# Patient Record
Sex: FEMALE | Race: WHITE | Employment: OTHER | URBAN - METROPOLITAN AREA
[De-identification: names, ages, dates, MRNs, and addresses within clinical notes are randomized per-mention and may not be internally consistent; named-entity substitution may affect disease eponyms.]

---

## 2017-01-03 ENCOUNTER — OFFICE VISIT (OUTPATIENT)
Dept: ORTHOPEDIC SURGERY | Age: 57
End: 2017-01-03

## 2017-01-03 VITALS — BODY MASS INDEX: 20.95 KG/M2 | HEIGHT: 68 IN | WEIGHT: 138.23 LBS

## 2017-01-03 DIAGNOSIS — M75.101 TEAR OF RIGHT ROTATOR CUFF, UNSPECIFIED TEAR EXTENT: ICD-10-CM

## 2017-01-03 DIAGNOSIS — M25.511 RIGHT SHOULDER PAIN, UNSPECIFIED CHRONICITY: Primary | ICD-10-CM

## 2017-01-03 PROCEDURE — 99213 OFFICE O/P EST LOW 20 MIN: CPT | Performed by: ORTHOPAEDIC SURGERY

## 2017-01-03 PROCEDURE — 73030 X-RAY EXAM OF SHOULDER: CPT | Performed by: ORTHOPAEDIC SURGERY

## 2017-01-17 ENCOUNTER — OFFICE VISIT (OUTPATIENT)
Dept: ORTHOPEDIC SURGERY | Age: 57
End: 2017-01-17

## 2017-01-17 VITALS — WEIGHT: 138.23 LBS | BODY MASS INDEX: 20.95 KG/M2 | HEIGHT: 68 IN

## 2017-01-17 DIAGNOSIS — M75.81 TENDINITIS OF RIGHT ROTATOR CUFF: ICD-10-CM

## 2017-01-17 DIAGNOSIS — M75.01 ADHESIVE CAPSULITIS OF RIGHT SHOULDER: ICD-10-CM

## 2017-01-17 PROCEDURE — 20610 DRAIN/INJ JOINT/BURSA W/O US: CPT | Performed by: ORTHOPAEDIC SURGERY

## 2017-01-17 PROCEDURE — 99213 OFFICE O/P EST LOW 20 MIN: CPT | Performed by: ORTHOPAEDIC SURGERY

## 2017-01-24 ENCOUNTER — HOSPITAL ENCOUNTER (OUTPATIENT)
Dept: PHYSICAL THERAPY | Age: 57
Discharge: HOME | End: 2017-01-31
Admitting: ORTHOPAEDIC SURGERY

## 2017-01-27 ENCOUNTER — HOSPITAL ENCOUNTER (OUTPATIENT)
Dept: PHYSICAL THERAPY | Age: 57
Discharge: HOME | End: 2017-01-27
Admitting: ORTHOPAEDIC SURGERY

## 2017-01-31 ENCOUNTER — HOSPITAL ENCOUNTER (OUTPATIENT)
Dept: PHYSICAL THERAPY | Age: 57
Discharge: HOME | End: 2017-01-31
Admitting: ORTHOPAEDIC SURGERY

## 2017-02-03 ENCOUNTER — HOSPITAL ENCOUNTER (OUTPATIENT)
Dept: PHYSICAL THERAPY | Age: 57
Discharge: HOME | End: 2017-02-03
Admitting: ORTHOPAEDIC SURGERY

## 2017-02-07 ENCOUNTER — HOSPITAL ENCOUNTER (OUTPATIENT)
Dept: PHYSICAL THERAPY | Age: 57
Discharge: HOME | End: 2017-02-07
Admitting: ORTHOPAEDIC SURGERY

## 2017-02-15 ENCOUNTER — OFFICE VISIT (OUTPATIENT)
Dept: ORTHOPEDIC SURGERY | Age: 57
End: 2017-02-15

## 2017-02-15 VITALS — BODY MASS INDEX: 21.22 KG/M2 | WEIGHT: 140 LBS | HEIGHT: 68 IN

## 2017-02-15 DIAGNOSIS — M22.42 PATELLOFEMORAL CHONDROSIS, LEFT: ICD-10-CM

## 2017-02-15 DIAGNOSIS — M25.562 LEFT KNEE PAIN, UNSPECIFIED CHRONICITY: Primary | ICD-10-CM

## 2017-02-15 PROBLEM — M22.40 PATELLOFEMORAL CHONDROSIS: Status: ACTIVE | Noted: 2017-02-15

## 2017-02-15 PROCEDURE — 73564 X-RAY EXAM KNEE 4 OR MORE: CPT | Performed by: ORTHOPAEDIC SURGERY

## 2017-02-15 PROCEDURE — 99213 OFFICE O/P EST LOW 20 MIN: CPT | Performed by: ORTHOPAEDIC SURGERY

## 2017-03-03 ENCOUNTER — OFFICE VISIT (OUTPATIENT)
Dept: ORTHOPEDIC SURGERY | Age: 57
End: 2017-03-03

## 2017-03-03 VITALS — BODY MASS INDEX: 21.22 KG/M2 | WEIGHT: 139.99 LBS | HEIGHT: 68 IN

## 2017-03-03 DIAGNOSIS — M75.01 ADHESIVE CAPSULITIS OF RIGHT SHOULDER: Primary | ICD-10-CM

## 2017-03-03 PROCEDURE — 99213 OFFICE O/P EST LOW 20 MIN: CPT | Performed by: ORTHOPAEDIC SURGERY

## 2017-03-06 ENCOUNTER — OFFICE VISIT (OUTPATIENT)
Dept: ORTHOPEDIC SURGERY | Age: 57
End: 2017-03-06

## 2017-03-06 VITALS
HEART RATE: 85 BPM | SYSTOLIC BLOOD PRESSURE: 110 MMHG | DIASTOLIC BLOOD PRESSURE: 79 MMHG | HEIGHT: 68 IN | BODY MASS INDEX: 21.22 KG/M2 | WEIGHT: 139.99 LBS

## 2017-03-06 DIAGNOSIS — M50.30 DDD (DEGENERATIVE DISC DISEASE), CERVICAL: ICD-10-CM

## 2017-03-06 DIAGNOSIS — R20.2 ARM PARESTHESIA, RIGHT: ICD-10-CM

## 2017-03-06 DIAGNOSIS — M47.812 SPONDYLOSIS OF CERVICAL REGION WITHOUT MYELOPATHY OR RADICULOPATHY: Primary | ICD-10-CM

## 2017-03-06 DIAGNOSIS — M54.2 CERVICAL PAIN (NECK): ICD-10-CM

## 2017-03-06 PROCEDURE — 72040 X-RAY EXAM NECK SPINE 2-3 VW: CPT | Performed by: PHYSICAL MEDICINE & REHABILITATION

## 2017-03-06 PROCEDURE — 99214 OFFICE O/P EST MOD 30 MIN: CPT | Performed by: PHYSICAL MEDICINE & REHABILITATION

## 2017-03-08 ENCOUNTER — OFFICE VISIT (OUTPATIENT)
Dept: ORTHOPEDIC SURGERY | Age: 57
End: 2017-03-08

## 2017-03-08 DIAGNOSIS — M79.601 PAIN OF RIGHT UPPER EXTREMITY: Primary | ICD-10-CM

## 2017-03-08 DIAGNOSIS — M79.605 PAIN OF LEFT LOWER EXTREMITY: ICD-10-CM

## 2017-03-08 PROCEDURE — 95886 MUSC TEST DONE W/N TEST COMP: CPT | Performed by: PHYSICAL MEDICINE & REHABILITATION

## 2017-03-08 PROCEDURE — 95910 NRV CNDJ TEST 7-8 STUDIES: CPT | Performed by: PHYSICAL MEDICINE & REHABILITATION

## 2017-03-13 ENCOUNTER — OFFICE VISIT (OUTPATIENT)
Dept: ORTHOPEDIC SURGERY | Age: 57
End: 2017-03-13

## 2017-03-13 VITALS — WEIGHT: 139.99 LBS | HEIGHT: 68 IN | BODY MASS INDEX: 21.22 KG/M2

## 2017-03-13 DIAGNOSIS — M54.17 LUMBOSACRAL RADICULOPATHY AT L4: Primary | ICD-10-CM

## 2017-03-13 PROCEDURE — 99213 OFFICE O/P EST LOW 20 MIN: CPT | Performed by: ORTHOPAEDIC SURGERY

## 2017-03-17 ENCOUNTER — OFFICE VISIT (OUTPATIENT)
Dept: ORTHOPEDIC SURGERY | Age: 57
End: 2017-03-17

## 2017-03-17 VITALS
SYSTOLIC BLOOD PRESSURE: 119 MMHG | BODY MASS INDEX: 21.22 KG/M2 | HEIGHT: 68 IN | DIASTOLIC BLOOD PRESSURE: 70 MMHG | WEIGHT: 140 LBS

## 2017-03-17 DIAGNOSIS — M54.12 CERVICAL RADICULOPATHY: ICD-10-CM

## 2017-03-17 DIAGNOSIS — M47.816 SPONDYLOSIS OF LUMBAR REGION WITHOUT MYELOPATHY OR RADICULOPATHY: ICD-10-CM

## 2017-03-17 DIAGNOSIS — M50.20 CERVICAL DISC HERNIATION: Primary | ICD-10-CM

## 2017-03-17 DIAGNOSIS — M54.16 LUMBAR RADICULOPATHY: ICD-10-CM

## 2017-03-17 PROCEDURE — 99214 OFFICE O/P EST MOD 30 MIN: CPT | Performed by: PHYSICAL MEDICINE & REHABILITATION

## 2017-03-29 ENCOUNTER — HOSPITAL ENCOUNTER (OUTPATIENT)
Dept: PHYSICAL THERAPY | Age: 57
Discharge: HOME | End: 2017-03-29
Admitting: ORTHOPAEDIC SURGERY

## 2017-03-31 ENCOUNTER — HOSPITAL ENCOUNTER (OUTPATIENT)
Dept: PHYSICAL THERAPY | Age: 57
Discharge: HOME | End: 2017-03-31
Admitting: ORTHOPAEDIC SURGERY

## 2017-04-05 ENCOUNTER — HOSPITAL ENCOUNTER (OUTPATIENT)
Dept: PHYSICAL THERAPY | Age: 57
Discharge: HOME | End: 2017-04-05
Admitting: ORTHOPAEDIC SURGERY

## 2017-04-07 ENCOUNTER — HOSPITAL ENCOUNTER (OUTPATIENT)
Dept: PHYSICAL THERAPY | Age: 57
Discharge: HOME | End: 2017-04-07
Admitting: ORTHOPAEDIC SURGERY

## 2017-04-12 ENCOUNTER — HOSPITAL ENCOUNTER (OUTPATIENT)
Dept: PHYSICAL THERAPY | Age: 57
Discharge: HOME | End: 2017-04-12
Admitting: ORTHOPAEDIC SURGERY

## 2017-05-30 ENCOUNTER — OFFICE VISIT (OUTPATIENT)
Dept: ORTHOPEDIC SURGERY | Age: 57
End: 2017-05-30

## 2017-05-30 DIAGNOSIS — M25.542 ARTHRALGIA OF LEFT HAND: ICD-10-CM

## 2017-05-30 DIAGNOSIS — M25.541 ARTHRALGIA OF RIGHT HAND: Primary | ICD-10-CM

## 2017-05-30 DIAGNOSIS — M19.049 PRIMARY LOCALIZED OSTEOARTHROSIS, HAND, UNSPECIFIED LATERALITY: ICD-10-CM

## 2017-05-30 PROCEDURE — 20600 DRAIN/INJ JOINT/BURSA W/O US: CPT | Performed by: ORTHOPAEDIC SURGERY

## 2017-05-30 PROCEDURE — 73140 X-RAY EXAM OF FINGER(S): CPT | Performed by: ORTHOPAEDIC SURGERY

## 2017-05-30 PROCEDURE — 99213 OFFICE O/P EST LOW 20 MIN: CPT | Performed by: ORTHOPAEDIC SURGERY

## 2017-06-14 ENCOUNTER — OFFICE VISIT (OUTPATIENT)
Dept: ENT CLINIC | Age: 57
End: 2017-06-14

## 2017-06-14 VITALS
BODY MASS INDEX: 21.22 KG/M2 | SYSTOLIC BLOOD PRESSURE: 102 MMHG | HEIGHT: 68 IN | WEIGHT: 140 LBS | HEART RATE: 81 BPM | DIASTOLIC BLOOD PRESSURE: 64 MMHG

## 2017-06-14 DIAGNOSIS — Z87.09 HISTORY OF THROAT PROBLEM: ICD-10-CM

## 2017-06-14 DIAGNOSIS — R06.09 OTHER FORM OF DYSPNEA: Primary | ICD-10-CM

## 2017-06-14 DIAGNOSIS — J01.90 ACUTE BACTERIAL RHINOSINUSITIS: ICD-10-CM

## 2017-06-14 DIAGNOSIS — B96.89 ACUTE BACTERIAL RHINOSINUSITIS: ICD-10-CM

## 2017-06-14 PROCEDURE — 99214 OFFICE O/P EST MOD 30 MIN: CPT | Performed by: OTOLARYNGOLOGY

## 2017-06-14 RX ORDER — CEFDINIR 300 MG/1
600 CAPSULE ORAL DAILY
Qty: 28 CAPSULE | Refills: 0 | Status: SHIPPED | OUTPATIENT
Start: 2017-06-14 | End: 2017-06-28

## 2017-06-21 ENCOUNTER — OFFICE VISIT (OUTPATIENT)
Dept: ORTHOPEDIC SURGERY | Age: 57
End: 2017-06-21

## 2017-06-21 VITALS — WEIGHT: 139.99 LBS | BODY MASS INDEX: 21.22 KG/M2 | HEIGHT: 68 IN

## 2017-06-21 DIAGNOSIS — M54.16 LUMBAR RADICULOPATHY: Primary | ICD-10-CM

## 2017-06-21 DIAGNOSIS — H81.09 MENIERE'S DISEASE, COCHLEOVESTIBULAR, ACTIVE, UNSPECIFIED LATERALITY: ICD-10-CM

## 2017-06-21 PROCEDURE — 99213 OFFICE O/P EST LOW 20 MIN: CPT | Performed by: ORTHOPAEDIC SURGERY

## 2017-07-04 RX ORDER — TRIAMTERENE AND HYDROCHLOROTHIAZIDE 37.5; 25 MG/1; MG/1
CAPSULE ORAL
Qty: 90 CAPSULE | Refills: 0 | Status: SHIPPED | OUTPATIENT
Start: 2017-07-04 | End: 2017-10-02 | Stop reason: SDUPTHER

## 2017-07-08 ENCOUNTER — TELEPHONE (OUTPATIENT)
Dept: ENT CLINIC | Age: 57
End: 2017-07-08

## 2017-07-12 ENCOUNTER — TELEPHONE (OUTPATIENT)
Dept: ORTHOPEDIC SURGERY | Age: 57
End: 2017-07-12

## 2017-08-22 ENCOUNTER — OFFICE VISIT (OUTPATIENT)
Dept: ENT CLINIC | Age: 57
End: 2017-08-22

## 2017-08-22 VITALS
BODY MASS INDEX: 20.16 KG/M2 | HEIGHT: 68 IN | DIASTOLIC BLOOD PRESSURE: 67 MMHG | SYSTOLIC BLOOD PRESSURE: 95 MMHG | HEART RATE: 74 BPM | WEIGHT: 133 LBS

## 2017-08-22 DIAGNOSIS — J31.2 CHRONIC PHARYNGITIS: Primary | ICD-10-CM

## 2017-08-22 DIAGNOSIS — J37.0 CHRONIC LARYNGITIS: ICD-10-CM

## 2017-08-22 DIAGNOSIS — K21.9 LPRD (LARYNGOPHARYNGEAL REFLUX DISEASE): ICD-10-CM

## 2017-08-22 PROCEDURE — 31575 DIAGNOSTIC LARYNGOSCOPY: CPT | Performed by: OTOLARYNGOLOGY

## 2017-08-22 RX ORDER — CHOLECALCIFEROL (VITAMIN D3) 25 MCG
CAPSULE ORAL
COMMUNITY

## 2017-09-19 ENCOUNTER — OFFICE VISIT (OUTPATIENT)
Dept: ORTHOPEDIC SURGERY | Age: 57
End: 2017-09-19

## 2017-09-19 VITALS — HEIGHT: 67 IN | BODY MASS INDEX: 20.88 KG/M2 | WEIGHT: 133 LBS

## 2017-09-19 DIAGNOSIS — M19.049 PRIMARY LOCALIZED OSTEOARTHROSIS, HAND, UNSPECIFIED LATERALITY: Primary | ICD-10-CM

## 2017-09-19 PROBLEM — R20.0 NUMBNESS IN BOTH HANDS: Status: ACTIVE | Noted: 2017-09-19

## 2017-09-19 PROCEDURE — 99213 OFFICE O/P EST LOW 20 MIN: CPT | Performed by: ORTHOPAEDIC SURGERY

## 2017-09-19 PROCEDURE — L3918 METACARP FX ORTHOSIS PRE OTS: HCPCS | Performed by: ORTHOPAEDIC SURGERY

## 2017-09-29 ENCOUNTER — TELEPHONE (OUTPATIENT)
Dept: ORTHOPEDIC SURGERY | Age: 57
End: 2017-09-29

## 2017-10-02 DIAGNOSIS — H81.09 MENIERE'S DISEASE, COCHLEOVESTIBULAR, ACTIVE, UNSPECIFIED LATERALITY: ICD-10-CM

## 2017-10-06 RX ORDER — TRIAMTERENE AND HYDROCHLOROTHIAZIDE 37.5; 25 MG/1; MG/1
CAPSULE ORAL
Qty: 90 CAPSULE | Refills: 0 | Status: SHIPPED | OUTPATIENT
Start: 2017-10-06 | End: 2018-01-19 | Stop reason: SDUPTHER

## 2017-10-09 ENCOUNTER — TELEPHONE (OUTPATIENT)
Dept: ORTHOPEDIC SURGERY | Age: 57
End: 2017-10-09

## 2017-10-13 ENCOUNTER — TELEPHONE (OUTPATIENT)
Dept: ENT CLINIC | Age: 57
End: 2017-10-13

## 2017-10-13 NOTE — TELEPHONE ENCOUNTER
Dr. Maile Khan office phoned and stated they sent a request for records over last week. I told her, I had not seen it. She refaxed the request.   Records were faxed to Dr. Maile Khan office fax # [phone]. Request scanned into epic.

## 2017-10-18 ENCOUNTER — TELEPHONE (OUTPATIENT)
Dept: ENT CLINIC | Age: 57
End: 2017-10-18

## 2017-10-18 DIAGNOSIS — H81.09 MENIERE'S DISEASE, COCHLEOVESTIBULAR, ACTIVE, UNSPECIFIED LATERALITY: Primary | ICD-10-CM

## 2017-10-18 NOTE — TELEPHONE ENCOUNTER
Patient called and said that she had a colonoscopy done yesterday and she is having a Meniere's attack in response to the anesthesia. She is very dizzy and weak feeling. She had Zofran during the procedure which helped but she started feeling terrible as soon as she got home. They prescribed her a patch, but that only made her more dizzy. She is having surgery in 2 weeks and is concerned about having another reaction to the anesthesia. She asked if Dr. Mike Crowley could prescribe Antivert or a motion sickness patch to help prevent the dizziness and if he could prescribe it before the surgery. She would like it to be called into [facility] on [address] [facility] [facility] in [place]. Her phone number is [phone].

## 2017-10-19 RX ORDER — MECLIZINE HYDROCHLORIDE 25 MG/1
25 TABLET ORAL 3 TIMES DAILY PRN
Qty: 40 TABLET | Refills: 1 | Status: SHIPPED | OUTPATIENT
Start: 2017-10-19 | End: 2018-12-14 | Stop reason: SDUPTHER

## 2017-10-19 NOTE — TELEPHONE ENCOUNTER
I will send e-script for Antivert for her dizziness, in general.  However, she is not to take it before or after surgery unless it is approved by her surgeon and anesthesiologist.  She should speak to her surgeon and the anesthesiologist and see if they can give her a TransDerm scopolamine patch before surgery. It would not be appropriate for me to prescribe anything for her perioperatively.

## 2017-10-31 ENCOUNTER — TELEPHONE (OUTPATIENT)
Dept: ENT CLINIC | Age: 57
End: 2017-10-31

## 2017-11-15 ENCOUNTER — OFFICE VISIT (OUTPATIENT)
Dept: VASCULAR SURGERY | Age: 57
End: 2017-11-15

## 2017-11-15 VITALS
HEART RATE: 75 BPM | WEIGHT: 126 LBS | SYSTOLIC BLOOD PRESSURE: 110 MMHG | DIASTOLIC BLOOD PRESSURE: 74 MMHG | BODY MASS INDEX: 19.1 KG/M2 | HEIGHT: 68 IN

## 2017-11-15 DIAGNOSIS — I83.891 SYMPTOMATIC VARICOSE VEINS OF RIGHT LOWER EXTREMITY: Primary | ICD-10-CM

## 2017-11-15 PROCEDURE — 99203 OFFICE O/P NEW LOW 30 MIN: CPT | Performed by: SURGERY

## 2017-11-15 ASSESSMENT — ENCOUNTER SYMPTOMS
RESPIRATORY NEGATIVE: 1
BACK PAIN: 1
EYES NEGATIVE: 1
GASTROINTESTINAL NEGATIVE: 1

## 2017-11-15 NOTE — PROGRESS NOTES
Subjective:      Patient ID: Razia Boogie is a 61 y.o. female. HPI Self referral for evaluation of achy pain and discomfort R posterior calf associated with bulging veins. Worsening over the last several years. No stockings or previous venous interventions. No H/O SVT, venous ulceration/skin changes, bleeding or welling. + FH. Past Medical History:   Diagnosis Date    Asthma     Meniere disease     Osteoarthritis      Allergies   Allergen Reactions    Bupropion Shortness Of Breath    Moxifloxacin Shortness Of Breath    Citalopram Hydrobromide Other (See Comments)     Heart palpitataions    Mobic [Meloxicam]     Naproxen     Pantoprazole Sodium      dizziness    Tramadol      Current Outpatient Prescriptions   Medication Sig Dispense Refill    meclizine (ANTIVERT) 25 MG tablet Take 1 tablet by mouth 3 times daily as needed for Dizziness 40 tablet 1    triamterene-hydrochlorothiazide (DYAZIDE) 37.5-25 MG per capsule TAKE 1 CAPSULE DAILY 90 capsule 0    Cholecalciferol (VITAMIN D-3) 1000 units CAPS Take by mouth      Misc Natural Products (TART CHERRY ADVANCED PO) Take by mouth      BREO ELLIPTA 100-25 MCG/INH AEPB   11    NONFORMULARY Indications: rj patient takes Zantac 150 mg a day      NONFORMULARY Indications: the patient takes DHEA      NONFORMULARY Indications: the patient takes Fish Oil 2 times a day 120mg      NONFORMULARY Indications: the patient takes Osteobiflex      Multiple Vitamins-Minerals (THERAPEUTIC MULTIVITAMIN-MINERALS) tablet Take 1 tablet by mouth daily      albuterol (PROVENTIL) (2.5 MG/3ML) 0.083% nebulizer solution Take 2.5 mg by nebulization every 6 hours as needed for Wheezing      Vitamins-Lipotropics (LIPOFLAVONOID) TABS Take two tablets by mouth three times a day for 60 days, then one tablet three times a day.  180 tablet 5    cetirizine (ZYRTEC) 10 MG tablet Take 10 mg by mouth daily      triamcinolone (NASACORT AQ) 55 MCG/ACT nasal inhaler 2 sprays by Nasal route daily.  Hormone Cream Base (HRT NATURAL CREAM BASE) CREA       ibuprofen (ADVIL;MOTRIN) 800 MG tablet Take 800 mg by mouth every 6 hours as needed for Pain.  progesterone (PROMETRIUM) 100 MG capsule   11     No current facility-administered medications for this visit. Social History     Social History    Marital status:      Spouse name: N/A    Number of children: N/A    Years of education: N/A     Occupational History    Not on file. Social History Main Topics    Smoking status: Never Smoker    Smokeless tobacco: Never Used    Alcohol use Not on file    Drug use: Unknown    Sexual activity: Not on file     Other Topics Concern    Not on file     Social History Narrative    No narrative on file     Family History   Problem Relation Age of Onset    Arthritis Mother     Cancer Mother     High Blood Pressure Mother     Macular Degen Mother     Arthritis Father     Hearing Loss Father     High Blood Pressure Father     Stroke Father     Arthritis Sister     Depression Brother     Heart Disease Brother     High Blood Pressure Brother          Review of Systems   Constitutional: Negative. HENT: Negative. Eyes: Negative. Respiratory: Negative. Cardiovascular: Negative. Gastrointestinal: Negative. Endocrine: Negative. Genitourinary: Negative. Musculoskeletal: Positive for back pain, joint swelling and neck stiffness. Skin: Negative. Allergic/Immunologic: Positive for environmental allergies. Neurological: Positive for dizziness, light-headedness, numbness and headaches. Hematological: Bruises/bleeds easily. Psychiatric/Behavioral: Negative. Objective:   Physical Exam   Constitutional: She is oriented to person, place, and time. She appears well-developed and well-nourished. HENT:   Head: Normocephalic and atraumatic. Eyes: Conjunctivae and EOM are normal.   Neck: Normal range of motion. Neck supple. No JVD present.  No tracheal deviation present. No thyromegaly present. Cardiovascular: Normal rate, regular rhythm, normal heart sounds and intact distal pulses. Pulmonary/Chest: Effort normal and breath sounds normal.   Abdominal: Soft. Bowel sounds are normal. She exhibits no mass. Musculoskeletal: She exhibits no edema or deformity. Lymphadenopathy:     She has no cervical adenopathy. Neurological: She is alert and oriented to person, place, and time. Skin: Skin is warm and dry. Psychiatric: She has a normal mood and affect. Her behavior is normal. Judgment and thought content normal.   Nursing note and vitals reviewed. R size  L size     Spider  telangiectasias       Reticular veins     Post calf 5-7 mm Varicose   veins       Assessment:      Symptomatic varicose veins R calf      Plan:      Begin KH 20/30 mmHg compression stockings. F/U with MD after venous reflux scan for review of results and planning.

## 2017-11-21 ENCOUNTER — TELEPHONE (OUTPATIENT)
Dept: VASCULAR SURGERY | Age: 57
End: 2017-11-21

## 2017-12-06 ENCOUNTER — OFFICE VISIT (OUTPATIENT)
Dept: VASCULAR SURGERY | Age: 57
End: 2017-12-06

## 2017-12-06 VITALS
WEIGHT: 126 LBS | HEIGHT: 68 IN | BODY MASS INDEX: 19.1 KG/M2 | SYSTOLIC BLOOD PRESSURE: 112 MMHG | HEART RATE: 72 BPM | DIASTOLIC BLOOD PRESSURE: 73 MMHG

## 2017-12-06 DIAGNOSIS — I83.891 SYMPTOMATIC VARICOSE VEINS OF RIGHT LOWER EXTREMITY: Primary | ICD-10-CM

## 2017-12-06 PROCEDURE — 99213 OFFICE O/P EST LOW 20 MIN: CPT | Performed by: SURGERY

## 2017-12-06 NOTE — PROGRESS NOTES
Seen for R calf varicose veins and associated pian. Wearing stockings without appreciable improvement. EXAM:  No edema    All VVs soft without tenderness or induration    No skin changes     VRS - no significant deep or GSV/LSV reflux; + Giacomini reflux feeding into varicosities    A/P: Chronic superficial venous insufficiency (Giacomini) R leg with secondary symptomatic varicose veins   SIGNIFICANT AXIAL REFLUX with LARGE VARICOSITIES R calf. Surgery is recommended - R Giacomini RFA + stab phlebectomies. This was discussed in terms that the patient could understand. The risks, including bleeding, clotting, bruising, swelling, neuritis, skin dimpling, infection, pigmentation, scarring, and mortality were discussed. I answered all questions pertaining to surgery and post operative expectations related to return to work and daily activity. Time spent counseling and coordination of care: 25 minutes. More than 50% of visit was spent reviewing and discussing venous duplex scan. She will continue compression stockings and consider the recommendations contacting us to schedule as recommended if desired.

## 2017-12-19 ENCOUNTER — TELEPHONE (OUTPATIENT)
Dept: ENT CLINIC | Age: 57
End: 2017-12-19

## 2018-01-04 DIAGNOSIS — H81.09 MENIERE'S DISEASE, COCHLEOVESTIBULAR, ACTIVE, UNSPECIFIED LATERALITY: ICD-10-CM

## 2018-01-14 RX ORDER — TRIAMTERENE AND HYDROCHLOROTHIAZIDE 37.5; 25 MG/1; MG/1
CAPSULE ORAL
Qty: 90 CAPSULE | Refills: 0 | OUTPATIENT
Start: 2018-01-14

## 2018-01-16 ENCOUNTER — OFFICE VISIT (OUTPATIENT)
Dept: ORTHOPEDIC SURGERY | Age: 58
End: 2018-01-16

## 2018-01-16 VITALS — HEIGHT: 68 IN | BODY MASS INDEX: 19.11 KG/M2 | WEIGHT: 126.1 LBS

## 2018-01-16 DIAGNOSIS — R20.0 NUMBNESS IN BOTH HANDS: ICD-10-CM

## 2018-01-16 DIAGNOSIS — M19.049 PRIMARY LOCALIZED OSTEOARTHROSIS OF HAND, UNSPECIFIED LATERALITY: Primary | ICD-10-CM

## 2018-01-16 PROCEDURE — 99213 OFFICE O/P EST LOW 20 MIN: CPT | Performed by: ORTHOPAEDIC SURGERY

## 2018-01-16 PROCEDURE — 20600 DRAIN/INJ JOINT/BURSA W/O US: CPT | Performed by: ORTHOPAEDIC SURGERY

## 2018-01-16 NOTE — PROGRESS NOTES
HISTORY OF PRESENT ILLNESS:  The patient has back and has had spine surgery on her low back and is now contemplating additional treatment for her cervical spine where she states she has a problem with a disc at C3. However, her thumbs and her hands become increasingly bothersome. Now her left thumb is even more of a problem and sometimes the pain will radiate up the radial forearm. To review, she has also had documented prior carpal tunnel syndrome as well. PAST MEDICAL HISTORY: Patient's medications, allergies, past medical, surgical, social and family histories were reviewed and updated as appropriate. ROS: Pertinent items are noted in HPI. Review of systems reviewed from patient history form dated on 5/30/2017 and available in the patient's chart under the media tab. PHYSICAL EXAMINATION: Examination reveals a pleasant individual in no acute distress. There appears to be satisfactory pain-free range of motion, strength, and stability of the cervical spine, shoulders, elbows, and wrists. Skin is intact without lymphadenopathy, discoloration, or abnormal temperature. There is intact, symmetric circulation in both upper extremities. Tenderness is elicited with CMC grind of the left and also right thumb with pain on firm pressure over the trapeziometacarpal joint. Satisfactory stability exists at the MP joints. Provocative testing for carpal tunnel syndrome today also seems to provided a minimum local discomfort and also exacerbate some tingling in the median nerve distribution. 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 We discussed this common entity and appropriate conservative and surgical options. Appropriate initial steps include activity modification, rest, splinting, hand therapy, and injection. I also recommend utilizing  modifiers that decrease thumb pinch stress.  Surgical intervention can usually be reserved for longstanding recalcitrant cases. In addition, I also think she has an element of carpal tunnel syndrome from prior examination and testing, and we talked about the significance of that. However, she is focused on making sure she attends to her cervical spine once her lumbar spine has healed fully, and she is interested in some options to calm down each thumb on today's visit. I did talk with her about the relative value of a cortisone injection since surgical intervention is not her primary goal at this juncture. Under sterile conditions, I injected the right and also the left thumb CMC joint with 1% lidocaine and 1 mL of Celestone on each. Appropriate injection risks and instructions were discussed. Appropriate followup plans are discussed with the patient depending on the level of progress with the conservative care. Down the road if she is at a point where she would like to consider definitive care for the thumb arthritis, I would favor a thumb CMC arthroplasty along with carpal tunnel release. She has a good understanding and will follow-up depending on her level of symptoms and relief with intermittent bracing. Please note that this transcription was created using voice recognition software. Any errors are unintentional and may be due to voice recognition transcription.

## 2018-01-19 NOTE — TELEPHONE ENCOUNTER
INCOMING FAX REFILL REQUEST FOR 90 DAY SUPPLY    triamterene-hydrochlorothiazide (DYAZIDE) 37.5-25 MG     LAST OFFICE VISIT 8/22/17  LAST REFILL 10/6/17      Rx PENDING

## 2018-01-24 RX ORDER — TRIAMTERENE AND HYDROCHLOROTHIAZIDE 37.5; 25 MG/1; MG/1
CAPSULE ORAL
Qty: 90 CAPSULE | Refills: 0 | Status: SHIPPED | OUTPATIENT
Start: 2018-01-24 | End: 2018-04-09 | Stop reason: SDUPTHER

## 2018-01-30 ENCOUNTER — TELEPHONE (OUTPATIENT)
Dept: VASCULAR SURGERY | Age: 58
End: 2018-01-30

## 2018-03-05 ENCOUNTER — OFFICE VISIT (OUTPATIENT)
Dept: ENT CLINIC | Age: 58
End: 2018-03-05

## 2018-03-05 VITALS
DIASTOLIC BLOOD PRESSURE: 63 MMHG | BODY MASS INDEX: 18.94 KG/M2 | SYSTOLIC BLOOD PRESSURE: 96 MMHG | HEART RATE: 85 BPM | HEIGHT: 68 IN | WEIGHT: 125 LBS

## 2018-03-05 DIAGNOSIS — H81.09 MENIERE'S DISEASE, COCHLEOVESTIBULAR, ACTIVE, UNSPECIFIED LATERALITY: ICD-10-CM

## 2018-03-05 DIAGNOSIS — J32.9 CHRONIC SINUSITIS, UNSPECIFIED LOCATION: Primary | ICD-10-CM

## 2018-03-05 DIAGNOSIS — G89.29 CHRONIC NONINTRACTABLE HEADACHE, UNSPECIFIED HEADACHE TYPE: ICD-10-CM

## 2018-03-05 DIAGNOSIS — H92.03 OTALGIA OF BOTH EARS: ICD-10-CM

## 2018-03-05 DIAGNOSIS — R51.9 CHRONIC NONINTRACTABLE HEADACHE, UNSPECIFIED HEADACHE TYPE: ICD-10-CM

## 2018-03-05 PROCEDURE — 99214 OFFICE O/P EST MOD 30 MIN: CPT | Performed by: OTOLARYNGOLOGY

## 2018-03-05 RX ORDER — HYDROCORTISONE AND ACETIC ACID 20.75; 10.375 MG/ML; MG/ML
4 SOLUTION AURICULAR (OTIC) 4 TIMES DAILY
Qty: 1 BOTTLE | Refills: 1 | Status: SHIPPED | OUTPATIENT
Start: 2018-03-05 | End: 2018-03-15

## 2018-03-05 NOTE — PATIENT INSTRUCTIONS
If sinus CT is normal, then see your PCP for evaluation and management of your chronic headache. HOME INSTRUCTIONS FOR TMJ (TEMPOROMANDIBULAR JOINT DISORDER)  · Eat a soft diet, with no tough meat for ten days and then prn TMJ pain. · Do not chew gum. · Heating Pad Therapy:  Use a heating pad on medium heat to the TMJ area for about 30 to 45 minutes, with a one minute break every five minutes, three times daily. · Take Ibuprofen as recommended. · If ear pain remains uncontrolled, return to office, and we will consider other measures, including possible CT or MRI scans, endoscopy of the throat and possible biopsies, and/or referral to a dental TMJ specialist oral surgeon for bite splint.

## 2018-03-05 NOTE — PROGRESS NOTES
Indications: rj patient takes Zantac 150 mg a day      NONFORMULARY Indications: the patient takes DHEA      NONFORMULARY Indications: the patient takes Fish Oil 2 times a day 120mg      NONFORMULARY Indications: the patient takes Osteobiflex      Multiple Vitamins-Minerals (THERAPEUTIC MULTIVITAMIN-MINERALS) tablet Take 1 tablet by mouth daily      albuterol (PROVENTIL) (2.5 MG/3ML) 0.083% nebulizer solution Take 2.5 mg by nebulization every 6 hours as needed for Wheezing      Vitamins-Lipotropics (LIPOFLAVONOID) TABS Take two tablets by mouth three times a day for 60 days, then one tablet three times a day. 180 tablet 5    cetirizine (ZYRTEC) 10 MG tablet Take 10 mg by mouth daily      triamcinolone (NASACORT AQ) 55 MCG/ACT nasal inhaler 2 sprays by Nasal route daily.  Hormone Cream Base (HRT NATURAL CREAM BASE) CREA       ibuprofen (ADVIL;MOTRIN) 800 MG tablet Take 800 mg by mouth every 6 hours as needed for Pain.  progesterone (PROMETRIUM) 100 MG capsule   11    meclizine (ANTIVERT) 25 MG tablet Take 1 tablet by mouth 3 times daily as needed for Dizziness 40 tablet 1     No current facility-administered medications for this visit. EXAMINATION:     VITALS SIGNS:    Vitals:    03/05/18 1348   BP: 96/63   Site: Right Arm   Position: Sitting   Pulse: 85   Weight: 125 lb (56.7 kg)   Height: 5' 8\" (1.727 m)      GENERAL APPEARANCE: Well developed, well nourished, no apparent distress, no apparent deformities. ABILITY TO COMMUNICATE/QUALITY OF VOICE:  Communicated without difficulty. Normal voice. (+) INSPECTION, HEAD AND FACE:  TMJs were popping, but nontender bilaterally. Normal overall appearance, with no scars, lesions or masses. SINUSES:  The maxillary and frontal sinuses were nontender, bilaterally. EARS, OTOMICROSCOPY:  The TMs and EACs appeared to be normal bilaterally. TM mobility was normal to pneumatic massage, bilaterally.     EXTERNAL EAR/NOSE:  Normal pinnae heat to the TMJ area for about 30 to 45 minutes, with a one minute break every five minutes, three times daily. · Take Ibuprofen as recommended. · If ear pain remains uncontrolled, return to office, and we will consider other measures, including possible CT or MRI scans, endoscopy of the throat and possible biopsies, and/or referral to a dental TMJ specialist oral surgeon for bite splint.

## 2018-03-12 ENCOUNTER — HOSPITAL ENCOUNTER (OUTPATIENT)
Dept: SURGERY | Age: 58
Discharge: HOME | End: 2018-03-12
Attending: SURGERY | Admitting: SURGERY

## 2018-03-15 ENCOUNTER — HOSPITAL ENCOUNTER (OUTPATIENT)
Dept: CT IMAGING | Age: 58
Discharge: HOME | End: 2018-03-15
Attending: OTOLARYNGOLOGY | Admitting: OTOLARYNGOLOGY

## 2018-03-15 DIAGNOSIS — J32.9 CHRONIC SINUSITIS, UNSPECIFIED LOCATION: ICD-10-CM

## 2018-03-15 DIAGNOSIS — R51.9 CHRONIC NONINTRACTABLE HEADACHE, UNSPECIFIED HEADACHE TYPE: ICD-10-CM

## 2018-03-15 DIAGNOSIS — G89.29 CHRONIC NONINTRACTABLE HEADACHE, UNSPECIFIED HEADACHE TYPE: ICD-10-CM

## 2018-03-15 DIAGNOSIS — J32.9 CHRONIC SINUSITIS: ICD-10-CM

## 2018-03-21 ENCOUNTER — TELEPHONE (OUTPATIENT)
Dept: ENT CLINIC | Age: 58
End: 2018-03-21

## 2018-04-09 DIAGNOSIS — H81.09 MENIERE'S DISEASE, COCHLEOVESTIBULAR, ACTIVE, UNSPECIFIED LATERALITY: ICD-10-CM

## 2018-04-10 RX ORDER — TRIAMTERENE AND HYDROCHLOROTHIAZIDE 37.5; 25 MG/1; MG/1
CAPSULE ORAL
Qty: 90 CAPSULE | Refills: 3 | Status: SHIPPED | OUTPATIENT
Start: 2018-04-10 | End: 2018-04-23 | Stop reason: SDUPTHER

## 2018-04-23 RX ORDER — TRIAMTERENE AND HYDROCHLOROTHIAZIDE 37.5; 25 MG/1; MG/1
CAPSULE ORAL
Qty: 90 CAPSULE | Refills: 3 | OUTPATIENT
Start: 2018-04-23 | End: 2018-10-30 | Stop reason: SINTOL

## 2018-05-01 ENCOUNTER — OFFICE VISIT (OUTPATIENT)
Dept: ORTHOPEDIC SURGERY | Age: 58
End: 2018-05-01

## 2018-05-01 VITALS — BODY MASS INDEX: 18.94 KG/M2 | WEIGHT: 125 LBS | HEIGHT: 68 IN

## 2018-05-01 DIAGNOSIS — M19.049 PRIMARY LOCALIZED OSTEOARTHROSIS OF HAND, UNSPECIFIED LATERALITY: Primary | ICD-10-CM

## 2018-05-01 DIAGNOSIS — G56.03 BILATERAL CARPAL TUNNEL SYNDROME: ICD-10-CM

## 2018-05-01 PROCEDURE — 20600 DRAIN/INJ JOINT/BURSA W/O US: CPT | Performed by: ORTHOPAEDIC SURGERY

## 2018-05-01 PROCEDURE — 99213 OFFICE O/P EST LOW 20 MIN: CPT | Performed by: ORTHOPAEDIC SURGERY

## 2018-06-29 DIAGNOSIS — H81.09 MENIERE'S DISEASE, COCHLEOVESTIBULAR, ACTIVE, UNSPECIFIED LATERALITY: ICD-10-CM

## 2018-06-29 NOTE — TELEPHONE ENCOUNTER
[facility] is requesting refills on patient's Triamterene-hydrochlorothiazide. Patient's LOV with Dr. Margarita Simms was on 03/05/2018. Patient has a follow up scheduled for 01/07/2019. RECOMMENDATIONS / PLAN:    1. See Patient Instructions. 2. Return in about 10 months (around 1/14/2019) for recheck/follow-up, and sooner if condition worsens, or fails to clear up with current treatments.

## 2018-07-24 ENCOUNTER — TELEPHONE (OUTPATIENT)
Dept: ORTHOPEDIC SURGERY | Age: 58
End: 2018-07-24

## 2018-08-03 RX ORDER — TRIAMTERENE AND HYDROCHLOROTHIAZIDE 37.5; 25 MG/1; MG/1
CAPSULE ORAL
Qty: 90 CAPSULE | Refills: 0 | OUTPATIENT
Start: 2018-08-03

## 2018-08-14 ENCOUNTER — OFFICE VISIT (OUTPATIENT)
Dept: ORTHOPEDIC SURGERY | Age: 58
End: 2018-08-14

## 2018-08-14 DIAGNOSIS — M19.049 PRIMARY LOCALIZED OSTEOARTHROSIS OF HAND, UNSPECIFIED LATERALITY: Primary | ICD-10-CM

## 2018-08-14 PROCEDURE — 99213 OFFICE O/P EST LOW 20 MIN: CPT | Performed by: ORTHOPAEDIC SURGERY

## 2018-08-14 PROCEDURE — 20600 DRAIN/INJ JOINT/BURSA W/O US: CPT | Performed by: ORTHOPAEDIC SURGERY

## 2018-08-14 RX ORDER — AMITRIPTYLINE HYDROCHLORIDE 10 MG/1
TABLET, FILM COATED ORAL
COMMUNITY
End: 2019-10-14 | Stop reason: ALTCHOICE

## 2018-08-14 NOTE — PROGRESS NOTES
INJECTION ADMINISTRATION DETAILS:    Date & Time:  8/14/18 4:02 PM  Site & Comments: [facility]  Administered by Dr Sola Treviño    Betamethasone (30mg/mL)  #of CC:1  NDC #: 1343-9029-95  Lot #: [id number]  EXP: 02/2020    1% Lidocaine ( 10mg/mL)  # of CC : 1  NDC #: 3088-6892-54  LOT# :[id number]  EXP :06/2021    Betamethasone (30mg/mL)  #of CC: 1  NDC #: 2958-3131-14  Lot #: [id number]  EXP: 02/2020    1% Lidocaine ( 10mg/mL)  # of CC : 1  [address]  #: 5280-1177-64  LOT# :[id number]  EXP :06/2021
and also left thumb CMC joint with 1% lidocaine and 1 mL of Celestone on each. Appropriate injection risks and instructions were discussed. Down the road when she has successfully healed from her neck surgery we will have her come back and we can ultimately then talk about a combination of carpal tunnel release and thumb CMC arthroplasty. Please note that this transcription was created using voice recognition software. Any errors are unintentional and may be due to voice recognition transcription.

## 2018-10-30 ENCOUNTER — OFFICE VISIT (OUTPATIENT)
Dept: ENT CLINIC | Age: 58
End: 2018-10-30
Payer: COMMERCIAL

## 2018-10-30 VITALS
HEIGHT: 67 IN | BODY MASS INDEX: 19.62 KG/M2 | WEIGHT: 125 LBS | HEART RATE: 76 BPM | DIASTOLIC BLOOD PRESSURE: 80 MMHG | RESPIRATION RATE: 76 BRPM | SYSTOLIC BLOOD PRESSURE: 121 MMHG

## 2018-10-30 DIAGNOSIS — H81.09 MENIERE'S DISEASE, COCHLEOVESTIBULAR, ACTIVE, UNSPECIFIED LATERALITY: Primary | ICD-10-CM

## 2018-10-30 DIAGNOSIS — L29.9 ITCHING OF EAR: ICD-10-CM

## 2018-10-30 PROCEDURE — 99214 OFFICE O/P EST MOD 30 MIN: CPT | Performed by: OTOLARYNGOLOGY

## 2018-10-30 RX ORDER — ALPRAZOLAM 0.25 MG/1
0.25 TABLET ORAL 3 TIMES DAILY PRN
Qty: 40 TABLET | Refills: 0 | Status: SHIPPED | OUTPATIENT
Start: 2018-10-30 | End: 2018-10-30 | Stop reason: SDUPTHER

## 2018-10-30 RX ORDER — FLUOCINOLONE ACETONIDE 0.11 MG/ML
OIL AURICULAR (OTIC)
Qty: 1 BOTTLE | Refills: 2 | Status: SHIPPED | OUTPATIENT
Start: 2018-10-30 | End: 2020-12-28 | Stop reason: SDUPTHER

## 2018-10-30 RX ORDER — ALPRAZOLAM 0.25 MG/1
0.25 TABLET ORAL 3 TIMES DAILY PRN
Qty: 40 TABLET | Refills: 0 | Status: SHIPPED | OUTPATIENT
Start: 2018-10-30 | End: 2019-01-30 | Stop reason: SDUPTHER

## 2018-11-06 ENCOUNTER — OFFICE VISIT (OUTPATIENT)
Dept: ORTHOPEDIC SURGERY | Age: 58
End: 2018-11-06
Payer: COMMERCIAL

## 2018-11-06 DIAGNOSIS — G56.03 BILATERAL CARPAL TUNNEL SYNDROME: Primary | ICD-10-CM

## 2018-11-06 DIAGNOSIS — M19.049 PRIMARY LOCALIZED OSTEOARTHROSIS OF HAND, UNSPECIFIED LATERALITY: ICD-10-CM

## 2018-11-06 PROCEDURE — 99213 OFFICE O/P EST LOW 20 MIN: CPT | Performed by: ORTHOPAEDIC SURGERY

## 2018-11-06 PROCEDURE — 20600 DRAIN/INJ JOINT/BURSA W/O US: CPT | Performed by: ORTHOPAEDIC SURGERY

## 2018-11-06 NOTE — PROGRESS NOTES
INJECTION ADMINISTRATION DETAILS:    Date & Time:  11/6/18 3:08 PM  Site & Comments: right cmc  Administered by Dr Brandon Terrell    Betamethasone (30mg/mL)  #of CC:1  NDC #: 3228-0240-97  Lot #: [id number]  EXP: 6/20    1% Lidocaine ( 10mg/mL)  # of CC : 1  [address] #: 1174-6198-26  LOT# :[id number]  EXP :3/21

## 2018-11-06 NOTE — PROGRESS NOTES
Chief Complaint:  Follow-up (bilateral cmc arthritis)      History of Present of Illness: the patient returns and has been having some increasing pain in the right thumb as she has also been getting ready for neck surgery in December. It sounds like she will be having a C5 6 fusion. She's been having some loss of dexterity into the hands with numbness and tingling. She does have a documented history of mild bilateral carpal tunnel syndrome but feels that this was significantly improved after prior epidural cortisone injections to the neck. Review of Systems  Pertinent items are noted in HPI  Denies fever, chills, confusion, bowel/bladder active change. Review of systems reviewed from Patient History Form dated on 8/14/2018 and available in the patient's chart under the Media tab. Examination:  On exam today she does have fullness over the base of each thumb but more so tender with thumb CMC grind on the right side than the left. Provocative testing for carpal tunnel syndrome is mildly positive bilaterally. Fingers are otherwise nicely perfused without triggering or instability. Radiology:     X-rays obtained and reviewed in office:  Views    Location    Impression      Orders Placed This Encounter   Procedures    OR BETAMETHASONE ACET&SOD PHOSP    OR ARTHROCENTESIS ASPIR&/INJ SMALL JT/BURSA W/O US       Impression:  Encounter Diagnoses   Name Primary?  Bilateral carpal tunnel syndrome Yes    Primary localized osteoarthrosis of hand, unspecified laterality          Treatment Plan:  Combination of bilateral thumb CMC arthritis, mild bilateral carpal tunnel syndrome, and cervical radiculopathy. I do agree with her plan to move forward with the cervical spine surgery and I think that she might benefit from a cortisone injection to help her comfort on the right thumb while also remembering to support with her brace techniques as well.     Under sterile conditions, I injected the right thumb CMC joint

## 2018-12-14 DIAGNOSIS — H81.09 MENIERE'S DISEASE, COCHLEOVESTIBULAR, ACTIVE, UNSPECIFIED LATERALITY: ICD-10-CM

## 2018-12-17 RX ORDER — MECLIZINE HYDROCHLORIDE 25 MG/1
25 TABLET ORAL 3 TIMES DAILY PRN
Qty: 40 TABLET | Refills: 1 | Status: SHIPPED | OUTPATIENT
Start: 2018-12-17 | End: 2022-09-26 | Stop reason: ALTCHOICE

## 2018-12-17 NOTE — TELEPHONE ENCOUNTER
[facility] is requesting refill on patient's Meclizine. Patient's LOV with Dr. Park Carpenter was on 10/30/2018. Patient has a follow up scheduled for 01/30/2019. Medication pending in epic for Dr. Park Carpenter to review.

## 2019-01-30 ENCOUNTER — OFFICE VISIT (OUTPATIENT)
Dept: ENT CLINIC | Age: 59
End: 2019-01-30
Payer: COMMERCIAL

## 2019-01-30 VITALS
DIASTOLIC BLOOD PRESSURE: 68 MMHG | WEIGHT: 126.7 LBS | BODY MASS INDEX: 19.84 KG/M2 | HEART RATE: 89 BPM | SYSTOLIC BLOOD PRESSURE: 97 MMHG

## 2019-01-30 DIAGNOSIS — H81.09 MENIERE'S DISEASE, COCHLEOVESTIBULAR, ACTIVE, UNSPECIFIED LATERALITY: ICD-10-CM

## 2019-01-30 PROBLEM — F41.9 ANXIETY: Status: ACTIVE | Noted: 2019-01-30

## 2019-01-30 PROBLEM — R20.0 NUMBNESS IN BOTH HANDS: Status: RESOLVED | Noted: 2017-09-19 | Resolved: 2019-01-30

## 2019-01-30 PROBLEM — M19.041 DEGENERATIVE JOINT DISEASE OF RIGHT HAND: Status: ACTIVE | Noted: 2019-01-30

## 2019-01-30 PROBLEM — K21.9 GASTROESOPHAGEAL REFLUX DISEASE WITHOUT ESOPHAGITIS: Status: ACTIVE | Noted: 2017-06-07

## 2019-01-30 PROBLEM — M51.369 DDD (DEGENERATIVE DISC DISEASE), LUMBAR: Status: ACTIVE | Noted: 2019-01-30

## 2019-01-30 PROBLEM — M48.061 SPINAL STENOSIS, LUMBAR REGION, WITHOUT NEUROGENIC CLAUDICATION: Status: ACTIVE | Noted: 2017-10-13

## 2019-01-30 PROBLEM — M51.36 DDD (DEGENERATIVE DISC DISEASE), LUMBAR: Status: ACTIVE | Noted: 2019-01-30

## 2019-01-30 PROBLEM — M50.222 OTHER CERVICAL DISC DISPLACEMENT AT C5-C6 LEVEL: Status: RESOLVED | Noted: 2019-01-30 | Resolved: 2019-01-30

## 2019-01-30 PROBLEM — M51.24 PROTRUSION OF THORACIC INTERVERTEBRAL DISC: Status: ACTIVE | Noted: 2017-03-01

## 2019-01-30 PROBLEM — M22.40 PATELLOFEMORAL CHONDROSIS: Status: RESOLVED | Noted: 2017-02-15 | Resolved: 2019-01-30

## 2019-01-30 PROBLEM — M54.17 LUMBOSACRAL RADICULOPATHY AT L4: Status: RESOLVED | Noted: 2017-03-13 | Resolved: 2019-01-30

## 2019-01-30 PROBLEM — H57.12 PAIN OF LEFT EYE: Status: RESOLVED | Noted: 2019-01-30 | Resolved: 2019-01-30

## 2019-01-30 PROBLEM — H57.12 PAIN OF LEFT EYE: Status: ACTIVE | Noted: 2019-01-30

## 2019-01-30 PROBLEM — M19.042 DEGENERATIVE JOINT DISEASE OF HAND, LEFT: Status: ACTIVE | Noted: 2019-01-30

## 2019-01-30 PROBLEM — M22.42 CHONDROMALACIA OF PATELLA, LEFT: Status: ACTIVE | Noted: 2019-01-30

## 2019-01-30 PROBLEM — M48.061 SPINAL STENOSIS, LUMBAR REGION, WITHOUT NEUROGENIC CLAUDICATION: Status: RESOLVED | Noted: 2017-10-13 | Resolved: 2019-01-30

## 2019-01-30 PROBLEM — J30.9 ALLERGIC RHINITIS: Status: ACTIVE | Noted: 2019-01-30

## 2019-01-30 PROBLEM — L29.9 ITCHING OF EAR: Status: RESOLVED | Noted: 2018-10-30 | Resolved: 2019-01-30

## 2019-01-30 PROBLEM — J45.909 REACTIVE AIRWAY DISEASE: Status: RESOLVED | Noted: 2019-01-30 | Resolved: 2019-01-30

## 2019-01-30 PROBLEM — M75.81 TENDINITIS OF RIGHT ROTATOR CUFF: Status: RESOLVED | Noted: 2017-01-17 | Resolved: 2019-01-30

## 2019-01-30 PROBLEM — M51.24 PROTRUSION OF THORACIC INTERVERTEBRAL DISC: Status: RESOLVED | Noted: 2017-03-01 | Resolved: 2019-01-30

## 2019-01-30 PROBLEM — Z98.890 HISTORY OF COLONOSCOPY: Status: RESOLVED | Noted: 2019-01-30 | Resolved: 2019-01-30

## 2019-01-30 PROBLEM — M50.222 OTHER CERVICAL DISC DISPLACEMENT AT C5-C6 LEVEL: Status: ACTIVE | Noted: 2019-01-30

## 2019-01-30 PROBLEM — J45.909 REACTIVE AIRWAY DISEASE: Status: ACTIVE | Noted: 2019-01-30

## 2019-01-30 PROBLEM — H92.03 OTALGIA OF BOTH EARS: Status: RESOLVED | Noted: 2018-03-05 | Resolved: 2019-01-30

## 2019-01-30 PROBLEM — K21.9 GASTROESOPHAGEAL REFLUX DISEASE WITHOUT ESOPHAGITIS: Status: RESOLVED | Noted: 2017-06-07 | Resolved: 2019-01-30

## 2019-01-30 PROBLEM — M19.041 DEGENERATIVE JOINT DISEASE OF RIGHT HAND: Status: RESOLVED | Noted: 2019-01-30 | Resolved: 2019-01-30

## 2019-01-30 PROBLEM — Z98.890 HISTORY OF COLONOSCOPY: Status: ACTIVE | Noted: 2019-01-30

## 2019-01-30 PROCEDURE — 99213 OFFICE O/P EST LOW 20 MIN: CPT | Performed by: OTOLARYNGOLOGY

## 2019-01-30 RX ORDER — CYCLOBENZAPRINE HCL 10 MG
10 TABLET ORAL 3 TIMES DAILY PRN
Refills: 0 | COMMUNITY
Start: 2018-12-20 | End: 2019-01-30

## 2019-01-30 RX ORDER — ALPRAZOLAM 0.25 MG/1
0.25 TABLET ORAL 3 TIMES DAILY PRN
Qty: 40 TABLET | Refills: 0 | Status: SHIPPED | OUTPATIENT
Start: 2019-01-30 | End: 2019-04-30

## 2019-01-30 RX ORDER — OXYCODONE HYDROCHLORIDE AND ACETAMINOPHEN 5; 325 MG/1; MG/1
TABLET ORAL
Refills: 0 | COMMUNITY
Start: 2018-12-19 | End: 2019-01-30

## 2019-01-30 RX ORDER — PROMETHAZINE HYDROCHLORIDE 25 MG/1
TABLET ORAL
Refills: 0 | COMMUNITY
Start: 2018-12-19 | End: 2020-01-03

## 2019-02-27 DIAGNOSIS — H81.09 MENIERE'S DISEASE, COCHLEOVESTIBULAR, ACTIVE, UNSPECIFIED LATERALITY: ICD-10-CM

## 2019-02-28 RX ORDER — ALPRAZOLAM 0.25 MG/1
0.25 TABLET ORAL 3 TIMES DAILY PRN
Qty: 40 TABLET | Refills: 0 | OUTPATIENT
Start: 2019-02-28 | End: 2019-05-29

## 2019-03-12 ENCOUNTER — OFFICE VISIT (OUTPATIENT)
Dept: ORTHOPEDIC SURGERY | Age: 59
End: 2019-03-12
Payer: COMMERCIAL

## 2019-03-12 VITALS — HEIGHT: 67 IN | RESPIRATION RATE: 16 BRPM | BODY MASS INDEX: 19.9 KG/M2 | WEIGHT: 126.76 LBS

## 2019-03-12 DIAGNOSIS — G56.03 BILATERAL CARPAL TUNNEL SYNDROME: Primary | ICD-10-CM

## 2019-03-12 DIAGNOSIS — M18.0 ARTHRITIS OF CARPOMETACARPAL (CMC) JOINT OF BOTH THUMBS: ICD-10-CM

## 2019-03-12 PROCEDURE — 99213 OFFICE O/P EST LOW 20 MIN: CPT | Performed by: ORTHOPAEDIC SURGERY

## 2019-03-12 PROCEDURE — 20600 DRAIN/INJ JOINT/BURSA W/O US: CPT | Performed by: ORTHOPAEDIC SURGERY

## 2019-06-14 ENCOUNTER — TELEPHONE (OUTPATIENT)
Dept: ORTHOPEDIC SURGERY | Age: 59
End: 2019-06-14

## 2019-06-18 ENCOUNTER — TELEPHONE (OUTPATIENT)
Dept: ORTHOPEDIC SURGERY | Age: 59
End: 2019-06-18

## 2019-06-21 ENCOUNTER — OFFICE VISIT (OUTPATIENT)
Dept: VASCULAR SURGERY | Age: 59
End: 2019-06-21
Payer: COMMERCIAL

## 2019-06-21 VITALS
SYSTOLIC BLOOD PRESSURE: 114 MMHG | BODY MASS INDEX: 20.25 KG/M2 | DIASTOLIC BLOOD PRESSURE: 78 MMHG | WEIGHT: 126 LBS | HEART RATE: 78 BPM | HEIGHT: 66 IN

## 2019-06-21 DIAGNOSIS — I83.893 SYMPTOMATIC VARICOSE VEINS OF BOTH LOWER EXTREMITIES: Primary | ICD-10-CM

## 2019-06-21 PROCEDURE — 99214 OFFICE O/P EST MOD 30 MIN: CPT | Performed by: SURGERY

## 2019-06-21 ASSESSMENT — ENCOUNTER SYMPTOMS
EYES NEGATIVE: 1
RESPIRATORY NEGATIVE: 1
BACK PAIN: 1
GASTROINTESTINAL NEGATIVE: 1

## 2019-06-21 NOTE — PROGRESS NOTES
Not on file     Minutes per session: Not on file    Stress: Not on file   Relationships    Social connections:     Talks on phone: Not on file     Gets together: Not on file     Attends [employer] service: Not on file     Active member of club or organization: Not on file     Attends meetings of clubs or organizations: Not on file     Relationship status: Not on file    Intimate partner violence:     Fear of current or ex partner: Not on file     Emotionally abused: Not on file     Physically abused: Not on file     Forced sexual activity: Not on file   Other Topics Concern    Not on file   Social History Narrative    Not on file     Family History   Problem Relation Age of Onset    Arthritis Mother     Cancer Mother     High Blood Pressure Mother     Macular Degen Mother     Arthritis Father     Hearing Loss Father     High Blood Pressure Father     Stroke Father     Arthritis Sister     Depression Brother     Heart Disease Brother     High Blood Pressure Brother          Review of Systems   Constitutional: Negative. HENT: Negative. Eyes: Negative. Respiratory: Negative. Cardiovascular: Negative. Gastrointestinal: Negative. Endocrine: Negative. Genitourinary: Negative. Musculoskeletal: Positive for arthralgias, back pain, joint swelling and neck stiffness. Skin: Negative. Allergic/Immunologic: Positive for environmental allergies. Neurological: Positive for dizziness, light-headedness, numbness and headaches. Hematological: Bruises/bleeds easily. Psychiatric/Behavioral: Negative. ROS confirmed by MD    Objective:   Physical Exam   Constitutional: She is oriented to person, place, and time. She appears well-developed and well-nourished. HENT:   Head: Normocephalic and atraumatic. Eyes: Conjunctivae and EOM are normal.   Neck: Normal range of motion. Neck supple. No JVD present. No tracheal deviation present. No thyromegaly present.    Cardiovascular: Normal rate, regular rhythm, normal heart sounds and intact distal pulses. Pulmonary/Chest: Effort normal and breath sounds normal.   Abdominal: Soft. Bowel sounds are normal. She exhibits no mass. Musculoskeletal: She exhibits no edema or deformity. Lymphadenopathy:     She has no cervical adenopathy. Neurological: She is alert and oriented to person, place, and time. Skin: Skin is warm and dry. Psychiatric: She has a normal mood and affect. Her behavior is normal. Judgment and thought content normal.   Nursing note and vitals reviewed. R size  L size     Spider  telangiectasias       Reticular veins     Groin/Post calf 5-7 mm Varicose   veins       Assessment:      1) Pain B legs  2) Symptomatic varicose veins R calf and groin      Plan:      Continue KH 20/30 mmHg compression stockings. F/U with MD after venous reflux scan for review of results and planning. Pt understands and agrees with this approach including re-imaging.

## 2019-06-26 ENCOUNTER — PROCEDURE VISIT (OUTPATIENT)
Dept: VASCULAR SURGERY | Age: 59
End: 2019-06-26
Payer: COMMERCIAL

## 2019-06-26 DIAGNOSIS — I83.11 VARICOSE VEINS OF BOTH LOWER EXTREMITIES WITH INFLAMMATION: Primary | ICD-10-CM

## 2019-06-26 DIAGNOSIS — I83.12 VARICOSE VEINS OF BOTH LOWER EXTREMITIES WITH INFLAMMATION: Primary | ICD-10-CM

## 2019-06-26 PROCEDURE — 93970 EXTREMITY STUDY: CPT | Performed by: SURGERY

## 2019-06-28 ENCOUNTER — OFFICE VISIT (OUTPATIENT)
Dept: VASCULAR SURGERY | Age: 59
End: 2019-06-28
Payer: COMMERCIAL

## 2019-06-28 VITALS
DIASTOLIC BLOOD PRESSURE: 70 MMHG | WEIGHT: 126 LBS | HEIGHT: 66 IN | SYSTOLIC BLOOD PRESSURE: 110 MMHG | BODY MASS INDEX: 20.25 KG/M2 | HEART RATE: 77 BPM

## 2019-06-28 DIAGNOSIS — I83.893 SYMPTOMATIC VARICOSE VEINS OF BOTH LOWER EXTREMITIES: Primary | ICD-10-CM

## 2019-06-28 PROCEDURE — 99213 OFFICE O/P EST LOW 20 MIN: CPT | Performed by: SURGERY

## 2019-06-28 NOTE — PROGRESS NOTES
Seen today for leg discomfort associated with varicose veins R leg. Stockings \"feel good\" but hot. No swelling, palpable cords, tender red spots or skin changes since seen. EXAM:  No skin changes. All VVs soft, nontender - no redness     VRS - no B deep reflux; R BK GSV reflux with R giacomini reflux feeding VVs; no significant L leg reflux    A/P: Chronic superficial venous insufficiency R leg with secondary symptomatic varicose veins   Small varicose veins and spiders L leg without contributing venous insufficiency   SIGNIFICANT R AXIAL REFLUX with LARGE VARICOSITIES. Surgery is recommended - R BK GSV RFA + R Giacomini RFA + stab phlebectomies (to include calf ). This was discussed in terms that the patient could understand. The risks, including bleeding, clotting, bruising, swelling, neuritis, skin dimpling, infection, pigmentation, scarring, and mortality were discussed. I answered all questions pertaining to surgery and post operative expectations related to return to work and daily activity. Long term would address L leg as well as residual spiders R leg with sclerotherapy. Time spent counseling and coordination of care: 25 minutes. More than 50% of visit was spent reviewing and discussing venous duplex scan. She will continue compression stockings and consider the recommendations contacting us to schedule as recommended if desired.

## 2019-07-01 ENCOUNTER — TELEPHONE (OUTPATIENT)
Dept: VASCULAR SURGERY | Age: 59
End: 2019-07-01

## 2019-07-11 ENCOUNTER — TELEPHONE (OUTPATIENT)
Dept: VASCULAR SURGERY | Age: 59
End: 2019-07-11

## 2019-07-11 NOTE — TELEPHONE ENCOUNTER
Spoke with patient she would like to move forward with her surgery - she would like surgery some time end of September or October and provided a few dates. Explained I will work with insurance and mail information out to her. She expressed that she would only like to have stabs on R leg to keep cost down. Will discuss with Dr. Carolina Falk.   Santos Hale 7/11/19

## 2019-10-14 ENCOUNTER — OFFICE VISIT (OUTPATIENT)
Dept: ORTHOPEDIC SURGERY | Age: 59
End: 2019-10-14
Payer: COMMERCIAL

## 2019-10-14 VITALS
BODY MASS INDEX: 20.27 KG/M2 | WEIGHT: 126.1 LBS | DIASTOLIC BLOOD PRESSURE: 84 MMHG | HEART RATE: 67 BPM | SYSTOLIC BLOOD PRESSURE: 158 MMHG | HEIGHT: 66 IN

## 2019-10-14 DIAGNOSIS — M79.671 FOOT PAIN, RIGHT: ICD-10-CM

## 2019-10-14 DIAGNOSIS — M65.9 TENOSYNOVITIS OF FOOT: ICD-10-CM

## 2019-10-14 DIAGNOSIS — M79.672 FOOT PAIN, LEFT: Primary | ICD-10-CM

## 2019-10-14 PROCEDURE — 99203 OFFICE O/P NEW LOW 30 MIN: CPT | Performed by: PODIATRIST

## 2019-10-14 RX ORDER — ALPRAZOLAM 0.5 MG/1
0.5 TABLET ORAL NIGHTLY PRN
COMMUNITY
End: 2020-01-03 | Stop reason: SDUPTHER

## 2020-01-03 ENCOUNTER — OFFICE VISIT (OUTPATIENT)
Dept: ENT CLINIC | Age: 60
End: 2020-01-03
Payer: COMMERCIAL

## 2020-01-03 VITALS
TEMPERATURE: 97.3 F | WEIGHT: 126.5 LBS | SYSTOLIC BLOOD PRESSURE: 104 MMHG | HEIGHT: 68 IN | BODY MASS INDEX: 19.17 KG/M2 | HEART RATE: 85 BPM | DIASTOLIC BLOOD PRESSURE: 68 MMHG

## 2020-01-03 PROBLEM — M26.629 TMJ SYNDROME: Status: ACTIVE | Noted: 2020-01-03

## 2020-01-03 PROBLEM — H92.01 OTALGIA OF RIGHT EAR: Status: ACTIVE | Noted: 2020-01-03

## 2020-01-03 PROCEDURE — 1036F TOBACCO NON-USER: CPT | Performed by: OTOLARYNGOLOGY

## 2020-01-03 PROCEDURE — 99214 OFFICE O/P EST MOD 30 MIN: CPT | Performed by: OTOLARYNGOLOGY

## 2020-01-03 PROCEDURE — G8484 FLU IMMUNIZE NO ADMIN: HCPCS | Performed by: OTOLARYNGOLOGY

## 2020-01-03 PROCEDURE — G8427 DOCREV CUR MEDS BY ELIG CLIN: HCPCS | Performed by: OTOLARYNGOLOGY

## 2020-01-03 PROCEDURE — G8420 CALC BMI NORM PARAMETERS: HCPCS | Performed by: OTOLARYNGOLOGY

## 2020-01-03 PROCEDURE — 3017F COLORECTAL CA SCREEN DOC REV: CPT | Performed by: OTOLARYNGOLOGY

## 2020-01-03 RX ORDER — ALPRAZOLAM 0.25 MG/1
0.25 TABLET ORAL 3 TIMES DAILY PRN
Qty: 60 TABLET | Refills: 0 | Status: SHIPPED | OUTPATIENT
Start: 2020-01-03 | End: 2020-01-23

## 2020-01-03 NOTE — PATIENT INSTRUCTIONS
spoon or medicine cup. If you do not have a dose-measuring device, ask your pharmacist for one. Call your doctor if this medicine seems to stop working as well in treating your panic or anxiety symptoms. Do not stop using alprazolam suddenly, or you could have unpleasant withdrawal symptoms. Ask your doctor how to safely stop using alprazolam.  If you use this medicine long-term, you may need frequent medical tests. Store at room temperature away from moisture and heat. Keep track of the amount of medicine used from each new bottle. Alprazolam is a drug of abuse and you should be aware if anyone is using your medicine improperly or without a prescription. What happens if I miss a dose? Take the missed dose as soon as you remember. Skip the missed dose if it is almost time for your next scheduled dose. Do not take extra medicine to make up the missed dose. What happens if I overdose? Seek emergency medical attention or call the Poison Help line at 1-230.594.7020. An overdose of alprazolam can be fatal. Overdose symptoms may include extreme drowsiness, confusion, muscle weakness, loss of balance or coordination, feeling light-headed, and fainting. What should I avoid while taking alprazolam?  Alprazolam may impair your thinking or reactions. Be careful if you drive or do anything that requires you to be alert. Avoid drinking alcohol. Dangerous side effects could occur. Grapefruit and grapefruit juice may interact with alprazolam and lead to unwanted side effects. Discuss the use of grapefruit products with your doctor. What are the possible side effects of alprazolam?  Get emergency medical help if you have signs of an allergic reaction: hives; difficult breathing; swelling of your face, lips, tongue, or throat.   Call your doctor at once if you have:  · depressed mood, thoughts of suicide or hurting yourself;  · racing thoughts, increased energy, unusual risk-taking behavior;  · confusion, agitation, hostility, hallucinations;  · uncontrolled muscle movements, tremor, seizure (convulsions); or  · pounding heartbeats or fluttering in your chest.  Common side effects may include:  · drowsiness, feeling tired;  · slurred speech, lack of balance or coordination;  · memory problems; or  · feeling anxious early in the morning. This is not a complete list of side effects and others may occur. Call your doctor for medical advice about side effects. You may report side effects to FDA at 6-826-GFM-3068. What other drugs will affect alprazolam?  Taking alprazolam with other drugs that make you sleepy or slow your breathing can cause dangerous side effects or death. Ask your doctor before taking a sleeping pill, narcotic pain medicine, prescription cough medicine, a muscle relaxer, or medicine for anxiety, depression, or seizures. Tell your doctor about all your current medicines and any you start or stop using, especially:  · cimetidine;  · digoxin;  · fluvoxamine;  · nefazodone;  · ritonavir or other medicines to treat HIV or AIDS; or  · antifungal medicine --fluconazole, voriconazole. This list is not complete. Other drugs may interact with alprazolam, including prescription and over-the-counter medicines, vitamins, and herbal products. Not all possible interactions are listed in this medication guide. Where can I get more information? Your pharmacist can provide more information about alprazolam.  Remember, keep this and all other medicines out of the reach of children, never share your medicines with others, and use this medication only for the indication prescribed. Every effort has been made to ensure that the information provided by Tyesha Salguero Dr is accurate, up-to-date, and complete, but no guarantee is made to that effect. Drug information contained herein may be time sensitive.  [employer] information has been compiled for use by healthcare practitioners and consumers in the [facility] and

## 2020-01-03 NOTE — PROGRESS NOTES
[facility]       PCP:  Eliana Salmeron MD       CHIEF COMPLAINT:  Chief Complaint   Patient presents with    Otalgia       HISTORY OF PRESENT ILLNESS:   Eric Martinez is a 60 y.o. female, here today for evaluation and treatment of a problem in the right ear. The quality is pain. The severity is at a 8/10, ache. The duration is two weeks. The timing is intermittent  comes and goes. Comes on twice a day and take tylenol and takes care of it for the time being overnight and wake up and it is there at some time during the morning. Modifying factors include tylenol. Associated symptoms include swelling of gland in neck under the ear, and excessive mucus in the back of the throat. She took a Z-jennifer had left over but was a month . Did not help. She has GERD and had some dietary indiscretions over the holidays. Lot of salt. Meniere's is acting up a little bit. REVIEW OF SYSTEMS:  CONSTITUTIONAL:  Denied fever.  (+) chills of and on for a week. Denied unexplained weight loss, over 20 pounds in the past six months. EARS, NOSE, THROAT:  Denied otorrhea, hearing loss, tinnitus, nasal dyspnea, rhinorrhea, sore throat and hoarseness. PAST MEDICAL HISTORY:   Past Medical History:   Diagnosis Date    Asthma     Meniere disease     Osteoarthritis         Past Surgical History:   Procedure Laterality Date    CERVICAL FUSION  2018    cervical neck disection with fusion of C5 and [place] Dr. Richard Cunningham  10/17/2016    LUMBAR One Arch Keo SURGERY  10/2017    NASAL SINUS SURGERY            EXAMINATION:       Vitals:    20 1410   BP: 104/68   Pulse: 85   Temp: 97.3 °F (36.3 °C)   Weight: 126 lb 8 oz (57.4 kg)   Height: 5' 8\" (1.727 m)        VITALS SIGNS: were reviewed. GENERAL APPEARANCE: Well developed, well nourished, no apparent distress, no apparent deformities. EYES:  No nystagmus. Sclera and conjuctiva clear.     ABILITY TO COMMUNICATE/QUALITY transcription errors may be present despite my best efforts to edit errors.

## 2020-11-10 ENCOUNTER — OFFICE VISIT (OUTPATIENT)
Dept: ENT CLINIC | Age: 60
End: 2020-11-10
Payer: COMMERCIAL

## 2020-11-10 VITALS
BODY MASS INDEX: 18.25 KG/M2 | HEART RATE: 77 BPM | WEIGHT: 120 LBS | DIASTOLIC BLOOD PRESSURE: 74 MMHG | SYSTOLIC BLOOD PRESSURE: 114 MMHG

## 2020-11-10 PROBLEM — R09.89 CHRONIC THROAT CLEARING: Status: ACTIVE | Noted: 2020-11-10

## 2020-11-10 PROBLEM — R49.0 HOARSENESS OF VOICE: Status: ACTIVE | Noted: 2020-11-10

## 2020-11-10 PROBLEM — R06.09 CHRONIC DYSPNEA: Chronic | Status: ACTIVE | Noted: 2020-11-10

## 2020-11-10 PROCEDURE — 3017F COLORECTAL CA SCREEN DOC REV: CPT | Performed by: OTOLARYNGOLOGY

## 2020-11-10 PROCEDURE — G8427 DOCREV CUR MEDS BY ELIG CLIN: HCPCS | Performed by: OTOLARYNGOLOGY

## 2020-11-10 PROCEDURE — 99214 OFFICE O/P EST MOD 30 MIN: CPT | Performed by: OTOLARYNGOLOGY

## 2020-11-10 PROCEDURE — G8419 CALC BMI OUT NRM PARAM NOF/U: HCPCS | Performed by: OTOLARYNGOLOGY

## 2020-11-10 PROCEDURE — 1036F TOBACCO NON-USER: CPT | Performed by: OTOLARYNGOLOGY

## 2020-11-10 PROCEDURE — G8484 FLU IMMUNIZE NO ADMIN: HCPCS | Performed by: OTOLARYNGOLOGY

## 2020-11-10 NOTE — PROGRESS NOTES
[facility]        PCP:  Jenelle Negrete MD       CHIEF COMPLAINT:  Chief Complaint   Patient presents with    Other     Difficulty breathing in (inspiration), problems with laryngopharyngeal reflux, possible vocal cord dysfunction. HISTORY OF PRESENT ILLNESS:   Steven Rene is a 63 y.o. female, here today for evaluation and treatment of a problem located in the throat. The quality is difficulty breathing in. The severity is moderate, sometimes severe. The duration is 15 years, but worse over the past year. The timing is constant. Modifying factors include \"I breath better when I am close to the ocean. When my reflux is worse, my breathing is worse. \"  Associated symptoms include acid reflux. Also, \"every couple weeks, I get a choking sensation that lasts up to all day. I got food stuck in my throat about 10 days. I finally got it out. It was a piece of lettuce. My breathing is worse based on what I eat, how bad my reflux is, or what allergens I'm exposed to. I have a sore throat all the time. I have recurrent hoarseness, with a lower pitch of my voice, and a gravelly feeling to my voice. That comes and goes but is there on 4-5 of 7 days. I am constantly clearing my throat. My  has told me that I have recently had increased snoring. \"    She stated that her pulmonologist, Dr. Darleen Ureña,  diagnosed asthma in the past.  However, currently he feels that her trouble is not asthma, since it affects inspiration. Patient stated that she does have trouble breathing in and not out. Her pulmonologist feels that she may have vocal fold dysfunction and suggested the problem may be due to reflux and suggested Pepcid. \"I tried Pepcid, but it caused a bad headache and dizziness side effect. So I stopped it and then took Tagamet. My pulmonologist stopped my Breo Ellipta inhaler and I have been fine since.   I took prilosec in the past and and gained 10 pounds, years ago, and it was an immediate weight gain and I won't take that again. \"        REVIEW OF SYSTEMS:  CONSTITUTIONAL:  Denied fever and chills. Denied unexplained weight loss, over 20 pounds in the past six months. EARS, NOSE, THROAT:  Denied otorrhea, otalgia, hearing loss, tinnitus, epistaxis, nasal dyspnea, and rhinorrhea. PAST MEDICAL HISTORY:   Past Medical History:   Diagnosis Date    Asthma     Meniere disease     Osteoarthritis         Past Surgical History:   Procedure Laterality Date    CERVICAL FUSION  12/19/2018    cervical neck disection with fusion of C5 and Yoshi Ortiz  10/17/2016    LUMBAR One Arch Keo SURGERY  10/2017    NASAL SINUS SURGERY            EXAMINATION:     Vitals:    11/10/20 1308   BP: 114/74   Site: Right Upper Arm   Position: Sitting   Cuff Size: Medium Adult   Pulse: 77   Weight: 120 lb (54.4 kg)      VITALS SIGNS: were reviewed. GENERAL APPEARANCE: Well developed, well nourished, no apparent distress, no apparent deformities. ABILITY TO COMMUNICATE/QUALITY OF VOICE:  Communicated without difficulty. Normal voice. INSPECTION, HEAD AND FACE: Normal overall appearance, with no scars, lesions or masses. FACIAL STRENGTH, MOTION:  Normal and equal for all five branches bilaterally. EYES:  Extraocular motion was intact, bilaterally. Normal primary gaze alignment. Sclera and conjunctiva clear bilaterally. SINUSES:  The maxillary and frontal sinuses were nontender, bilaterally. SALIVARY GLANDS:  The parotid, submandibular, and sublingual glands were normal bilaterally. EXTERNAL EAR/NOSE:  Normal pinnae and mastoids. Normal external nose. EARS, OTOSCOPY: The TMs and EACs appeared to be normal bilaterally. NOSE:  The nasal septum was midline. The inferior turbinates were normal.  The nasal mucosa and secretions were normal.  No pus or polyps were seen.   LIPS, TEETH AND GUMS: Unremarkable. OROPHARYNX/ORAL CAVITY:  Oral mucosa, hard and soft palates, tongue, and pharynx were normal.  INDIRECT LARYNGOSCOPY:  Vocal cords normally mobile bilaterally with midline approximation on phonation. The epiglottis, supraglottis, false vocal cords, true vocal cords, mobility of the larynx, base of tongue, subglottis, and piriform sinuses appeared to be normal.   NECK:  No masses or tenderness. No abnormal appearance, asymmetry or abnormal tracheal position. Laryngeal cartilages and hyoid bone were normal to palpation, with normal laryngeal crepitus. THYROID:  No nodules, enlargement, tenderness or masses. LYMPH NODES, CERVICAL, FACIAL AND SUPRACLAVICULAR:  No lymphadenopathy. Mell Bansal / Laura Alexander / Aide Calhoun:       Yessica Gomez was seen today for other. Diagnoses and all orders for this visit:    Chronic dyspnea  Comments: On inspiration. Dysphagia, unspecified type  -     FL MODIFIED BARIUM SWALLOW W VIDEO  -     FL ESOPHAGRAM    Chronic pharyngitis    Hoarseness of voice    Chronic throat clearing         RECOMMENDATIONS / PLAN:    Return in about 15 days (around 11/25/2020) for flexible fiberoptic laryngoscopy, any further ENT or sinus problems or symptoms.

## 2020-11-11 ENCOUNTER — TELEPHONE (OUTPATIENT)
Dept: ENT CLINIC | Age: 60
End: 2020-11-11

## 2020-11-11 NOTE — TELEPHONE ENCOUNTER
Patient was seen here yesterday  , she want's to let Dr Bernabe Pearl that she got the name of medication wrong, she told him that she had taken Preacid recently , but it was Pepcid that she had taken recently , and had a adverse reaction to it   Please advise

## 2020-11-19 ENCOUNTER — TELEPHONE (OUTPATIENT)
Dept: ENT CLINIC | Age: 60
End: 2020-11-19

## 2020-11-19 NOTE — TELEPHONE ENCOUNTER
Pt phoned cancelled appt for flexscope next week. She has been waiting for a call regarding having an esophogram and barium swallow. She would like for the 2  Test to be scheduled prior to seeing Dr Gomez Brownlee. She will be available in December.   Please call pt

## 2020-11-19 NOTE — TELEPHONE ENCOUNTER
Please advise Carlos Ray that she needs to call the imaging department and schedule the modified barium swallow and esophagram.

## 2020-12-22 ENCOUNTER — HOSPITAL ENCOUNTER (OUTPATIENT)
Dept: GENERAL RADIOLOGY | Age: 60
Discharge: HOME | End: 2020-12-22
Payer: COMMERCIAL

## 2020-12-22 ENCOUNTER — HOSPITAL ENCOUNTER (OUTPATIENT)
Dept: SPEECH THERAPY | Age: 60
Setting detail: THERAPIES SERIES
Discharge: HOME | End: 2020-12-22
Payer: COMMERCIAL

## 2020-12-22 PROCEDURE — 74220 X-RAY XM ESOPHAGUS 1CNTRST: CPT

## 2020-12-22 PROCEDURE — 92526 ORAL FUNCTION THERAPY: CPT

## 2020-12-22 PROCEDURE — 92611 MOTION FLUOROSCOPY/SWALLOW: CPT

## 2020-12-22 PROCEDURE — 74230 X-RAY XM SWLNG FUNCJ C+: CPT

## 2020-12-22 NOTE — PROCEDURES
[facility] SPEECH THERAPY  MODIFIED BARIUM SWALLOW EVALUATION    Patient's Name: Adama Kilpatrick O.B: 1960  Medical Diagnosis: Dysphagia, unspecified type [R13.10]  Treatment Diagnosis: Dysphagia    Ordering MD: Dr. Bryon Montejo   Radiologist: Dr. Arturo Franco    Date of Evaluation: 12/22/2020  Type of Study: Modified Barium Swallowing Study (MBS)  Diet Prior to Study:  Regular texture diet with liquids   Pain Level:denies   Reason for referral: Pt referred by Dr. Bryon Montejo ENT. Pt reported difficulty swallowing characterized by globus sensation and intermittent difficulty swallowing solids. Pt reported she has intermittent difficulty breathing with inhalation. Impression:  Modified Barium Swallow evaluation completed on 12/22/2020. Results indicate swallow function to be grossly WNL. Prior hx of cervical fusion is noted. Oral phase of swallow appeared grossly WNL with adequate mastication, bolus control and bolus propulsion. No oral stasis was noted post swallows. Pharyngeal phase of swallow was grossly functional. Pooling to the pyriform prior to swallow initiation was noted however, airway protection during the swallow was adequate. No penetration or aspiration was viewed with any texture during this study. Pharyngeal clearing was adequate with only trace stasis noted post swallows.      Aspiration/Penetration Risk:  Minimal     Recommendations:    Diet Level: Regular texture diet with thin liquids   Strategies:  Upright 90 degrees at meals; oral care   Treatments: Speech Therapy for dysphagia treatment not indicated at this time     Consistencies given: Thin,Puree, Soft solid, Solid    Oral Phase  -Appeared grossly WNL     Pharyngeal Phase  -Delayed swallow onset  -Pooling to the pyriforms   -No penetration or aspiration     Esophageal Phase  Unremarkable    Following Evaluation:  Results/recommendations and education given to Patien who verbalized understanding    Timed Code Treatment: 0 minutes     Total Treatment Time: 25 minutes     Adebayo Kamara, [address] [facility] [address]  Speech Language Pathologist

## 2020-12-28 ENCOUNTER — OFFICE VISIT (OUTPATIENT)
Dept: ENT CLINIC | Age: 60
End: 2020-12-28
Payer: COMMERCIAL

## 2020-12-28 VITALS — HEART RATE: 76 BPM | TEMPERATURE: 97 F | SYSTOLIC BLOOD PRESSURE: 113 MMHG | DIASTOLIC BLOOD PRESSURE: 78 MMHG

## 2020-12-28 DIAGNOSIS — L29.9 ITCHING OF EAR: ICD-10-CM

## 2020-12-28 DIAGNOSIS — K21.9 LPRD (LARYNGOPHARYNGEAL REFLUX DISEASE): Primary | Chronic | ICD-10-CM

## 2020-12-28 DIAGNOSIS — J37.0 CHRONIC LARYNGITIS: Chronic | ICD-10-CM

## 2020-12-28 DIAGNOSIS — R06.09 CHRONIC DYSPNEA: Chronic | ICD-10-CM

## 2020-12-28 PROCEDURE — 31575 DIAGNOSTIC LARYNGOSCOPY: CPT | Performed by: OTOLARYNGOLOGY

## 2020-12-28 RX ORDER — ALPRAZOLAM 0.25 MG/1
0.25 TABLET ORAL 3 TIMES DAILY PRN
COMMUNITY
Start: 2020-09-17 | End: 2021-03-06

## 2020-12-28 RX ORDER — CIMETIDINE 300 MG/1
200 TABLET, FILM COATED ORAL DAILY
COMMUNITY

## 2020-12-28 RX ORDER — FLUOCINOLONE ACETONIDE 0.11 MG/ML
OIL AURICULAR (OTIC)
Qty: 1 BOTTLE | Refills: 2 | Status: SHIPPED | OUTPATIENT
Start: 2020-12-28 | End: 2022-09-26 | Stop reason: SDUPTHER

## 2020-12-28 RX ORDER — LANSOPRAZOLE 30 MG/1
CAPSULE, DELAYED RELEASE ORAL
Qty: 30 CAPSULE | Refills: 3 | Status: SHIPPED | OUTPATIENT
Start: 2020-12-28 | End: 2021-01-06 | Stop reason: SDUPTHER

## 2020-12-28 NOTE — PROGRESS NOTES
inspiration. Patient stated that she does have trouble breathing in and not out. Her pulmonologist feels that she may have vocal fold dysfunction and suggested the problem may be due to reflux and suggested Pepcid. \"I tried Pepcid, but it caused a bad headache and dizziness side effect. So I stopped it and then took Tagamet. My pulmonologist stopped my Breo Ellipta inhaler and I have been fine since. I took prilosec in the past and and gained 10 pounds, years ago, and it was an immediate weight gain and I won't take that again. \" \"       INFORMED CONSENT:  The procedure was described to the patient, including method of anesthesia. The patient was advised of the medical necessity for this procedure. The risks and potential complications were discussed, including, but not limited to, bleeding, infection, adverse reaction to medications, hoarseness, sore throat, inability to obtain adequate visualization, and future need for rigid operative endoscopy. The expected outcome, potential benefits and the alternatives of therapy were discussed. Alysa Tanner asked appropriate questions and then expressed the lack of any further questions, understanding, acceptance, and the desire to undergo with this procedure, granting verbal informed consent. FINDINGS:  There was minimal to mild edema and erythema of the arytenoid and interarytenoid mucosa consistent with posterior laryngitis secondary to laryngopharyngeal reflux. The vocal cords appeared to be normal, with no nodule, ulceration, polyp, leukoplakia or other lesions, and appeared to be normally mobile bilaterally with midline approximation on phonation. There was no evidence of paradoxical motion of the vocal cords. Sensation of the hypopharynx and larynx appeared to be normal when touched by the end of the flexible scope.  The nasopharynx, eustachian tube orifices and fossa of Rosenmüller, oropharynx, base of tongue, hypopharynx, supraglottis, subglottis, and piriform sinuses all appeared to be normal, with no lesions. Visualization was excellent throughout the examination. Examination, ears:  TMs and EACs appeared to normal.      DESCRIPTION OF PROCEDURE:  The right and left nasal cavity was topically anesthetized and decongested with a 50-50 mixture of 0.05% oxymetazoline solution and topical 4% lidocaine solution by nasal sprayer, twice. After about ten minutes, the flexible fiberoptic nasopharyngolaryngoscope, with camera attached, was inserted through the right nare/nasal cavity and advanced to the nasopharynx and then to the hypopharynx and larynx. The WHOOP video system was used. After adequate endoscopic visualization, the endoscope was removed. The patient appeared to tolerate the procedure well with no evidence of perioperative complications. IMPRESSION / Scooter Maloney / Marysol Bañuelos    Diagnoses and all orders for this visit:    LPRD (laryngopharyngeal reflux disease)  -     lansoprazole (PREVACID) 30 MG delayed release capsule; Take one tablet by mouth twice a day, on an empty stomach (when you have not eaten or drunk anything for two hours) and eat a meal or snack 45 to 60 minutes after each dose. Chronic laryngitis    Chronic dyspnea    Itching of ear  -     fluocinolone (DERMOTIC) 0.01 % OIL oil; Place 5 drops in the affected ear(s) 2 times daily, for 7 to 14 days, as needed for itching or dermatitis. If symptoms persists longer than 14 days, call office for appointment. RECOMMENDATIONS / PLAN    1. Seeing gastroenterologist soon. 2. Refill dermotic oil for itching in ears. 3. Return in about 3 months (around 3/28/2021) for repeat flexible fiberoptic throat endoscopy, recheck, follow-up and sooner if condition worsens.

## 2020-12-28 NOTE — PATIENT INSTRUCTIONS
must eat a meal or snack about 45 minutes after each dose to maximize uptake from the bloodstream of the medication by the acid producing cells of the stomach in order to decrease the amount of acid your stomach makes. If this happens, the pH of the fluids in the stomach is increased from 2 to 4 and the pepsin enzyme is inactive, or less active, and causes less inflammation of your throat. This will decrease your symptoms. Twice daily therapy may be accomplished by taking the medication first thing each morning and delaying your breakfast for about 45 minutes. Then, you can take the second dose of the medication 45 minutes before your dinner meal.  If you forget to take your pill before you eat dinner, wait until two hours after dinner and take your pill. Then, have a light snack or finish your dinner about 45 minutes later. Your primary physician or gastroenterologist may also be treating you for GERD. This treatment for LPR will not interfere with your GERD management. Please ask if you have any additional questions. Patient Education        lansoprazole  Pronunciation:  lucia CHINTAN pra zol  Brand:  FIRST Lansoprazole, Prevacid, Prevacid OTC, Prevacid SoluTab  What is the most important information I should know about lansoprazole? Lansoprazole can cause kidney problems. Tell your doctor if you are urinating less than usual, or if you have blood in your urine. Diarrhea may be a sign of a new infection. Call your doctor if you have diarrhea that is watery or has blood in it. Lansoprazole may cause new or worsening symptoms of lupus. Tell your doctor if you have joint pain and a skin rash on your cheeks or arms that worsens in sunlight. You may be more likely to have a broken bone while taking this medicine long term or more than once per day. What is lansoprazole? Lansoprazole is a proton pump inhibitor.  Lansoprazole decreases the amount of acid produced in the stomach. Lansoprazole is used to treat and prevent stomach and intestinal ulcers, erosive esophagitis (damage to the esophagus from stomach acid), and other conditions involving excessive stomach acid such as Zollinger-Bain syndrome. Over-the-counter lansoprazole (Prevacid OTC) is used to treat frequent heartburn that happens 2 or more days per week. Lansoprazole is not for immediate relief of heartburn symptoms. Lansoprazole may also be used for purposes not listed in this medication guide. What should I discuss with my healthcare provider before taking lansoprazole? Heartburn can mimic early symptoms of a heart attack. Get emergency medical help if you have chest pain that spreads to your jaw or shoulder and you feel anxious or light-headed. You should not use this medicine if you are allergic to lansoprazole, or if you take any medicine that contains rilpivirine ([facility]). Tell your doctor if you have ever had:  · liver disease;  · lupus;  · low levels of magnesium in your blood; or  · osteoporosis or low bone mineral density (osteopenia). Do not use over-the-counter lansoprazole (Prevacid OTC) without the advice of a doctor if you have:  · trouble or pain with swallowing;  · bloody or black stools; vomit that looks like blood or coffee grounds;  · heartburn that has lasted for over 3 months;  · frequent chest pain, heartburn with wheezing;  · unexplained weight loss;  · nausea or vomiting, stomach pain; or  · an electrolyte imbalance or metabolic disorder. Some forms of lansoprazole may contain phenylalanine. Tell your doctor if you have phenylketonuria (PKU). You may be more likely to have a broken bone in your hip, wrist, or spine while taking a proton pump inhibitor long-term or more than once per day. Talk with your doctor about ways to keep your bones healthy. Do not give lansoprazole to a child younger than 3year old.  Prevacid OTC is not approved for use by anyone younger than 25years old. Ask a doctor before using this medicine if you are pregnant or breast-feeding. How should I take lansoprazole? Follow all directions on your prescription label and read all medication guides or instruction sheets. Use the medicine exactly as directed. Lansoprazole is usually taken before eating. Prevacid OTC should be taken in the morning before you eat breakfast.  Read and carefully follow any Instructions for Use provided with your medicine. Ask your doctor or pharmacist if you do not understand these instructions. Shake the oral suspension (liquid) before you measure a dose. Use the dosing syringe provided, or use a medicine dose-measuring device (not a kitchen spoon). Swallow the capsule whole and do not crush, chew, break, or open it. Remove an orally disintegrating tablet from the package only when you are ready to take the medicine. Place the tablet in your mouth and allow it to dissolve, without chewing. Swallow several times as the tablet dissolves. Use this medicine for the full prescribed length of time, even if your symptoms quickly improve. Prevacid OTC should be taken only once daily for 14 days. It may take up to 4 days for full effect. Allow at least 4 months to pass before you start another 14-day treatment with Prevacid OTC. Call your doctor if your symptoms do not improve or if they get worse while you are taking lansoprazole. If you take Prevacid OTC, call your doctor if your heartburn gets worse over the 14-day treatment, or if you need treatment more than once every 4 months. Some conditions are treated with a combination of lansoprazole and antibiotics. Use all medications as directed. Store at room temperature away from moisture, heat, and light. Do not freeze the liquid medicine. What happens if I miss a dose? Take the medicine as soon as you can, but skip the missed dose if it is almost time for your next dose.  Do not take two doses at one time.  What happens if I overdose? Seek emergency medical attention or call the Poison Help line at 1-593.767.3000. What should I avoid while taking lansoprazole? This medicine can cause diarrhea, which may be a sign of a new infection. If you have diarrhea that is watery or bloody, call your doctor before using anti-diarrhea medicine. What are the possible side effects of lansoprazole? Get emergency medical help if you have signs of an allergic reaction: hives; difficulty breathing; swelling of your face, lips, tongue, or throat. Call your doctor at once if you have:  · severe stomach pain, diarrhea that is watery or bloody;  · new or unusual pain in your wrist, back, hip, or thigh;  · a seizure (convulsions);  · kidney problems --little or no urination, blood in your urine, swelling, rapid weight gain;  · low magnesium --dizziness, fast or irregular heart rate, tremors (shaking) or jerking muscle movements, feeling jittery, muscle cramps, muscle spasms in your hands and feet, cough or choking feeling; or  · new or worsening symptoms of lupus --joint pain, and a skin rash on your cheeks or arms that worsens in sunlight. Taking lansoprazole long-term may cause you to develop stomach growths called fundic gland polyps. Talk with your doctor about this risk. If you use lansoprazole for longer than 3 years, you could develop a vitamin B-12 deficiency. Talk to your doctor about how to manage this condition if you develop it. Common side effects may include:  · nausea, stomach pain;  · diarrhea, constipation; or  · headache. This is not a complete list of side effects and others may occur. Call your doctor for medical advice about side effects. You may report side effects to FDA at 1-313-HPX-9611. What other drugs will affect lansoprazole? Sucralfate (Carafate) can make it harder for your body to absorb lansoprazole. Wait at least 30 minutes after taking lansoprazole before you take sucralfate.   Tell your doctor if you use methotrexate. Many drugs can affect lansoprazole, and some drugs should not be used at the same time. This includes prescription and over-the-counter medicines, vitamins, and herbal products. Not all possible interactions are listed here. Tell your doctor about all your current medicines and any medicine you start or stop using. Where can I get more information? Your pharmacist can provide more information about lansoprazole. Remember, keep this and all other medicines out of the reach of children, never share your medicines with others, and use this medication only for the indication prescribed. Every effort has been made to ensure that the information provided by [address] is accurate, up-to-date, and complete, but no guarantee is made to that effect. Drug information contained herein may be time sensitive. [facility] information has been compiled for use by healthcare practitioners and consumers in the [place] and therefore [facility] does not warrant that uses outside of the [place] are appropriate, unless specifically indicated otherwise. [facility]'s drug information does not endorse drugs, diagnose patients or recommend therapy. [facility]'s drug information is an informational resource designed to assist licensed healthcare practitioners in caring for their patients and/or to serve consumers viewing this service as a supplement to, and not a substitute for, the expertise, skill, knowledge and judgment of healthcare practitioners. The absence of a warning for a given drug or drug combination in no way should be construed to indicate that the drug or drug combination is safe, effective or appropriate for any given patient. [facility] does not assume any responsibility for any aspect of healthcare administered with the aid of information [facility] provides.  The information contained herein is not intended to cover all possible uses, directions, precautions, warnings, drug interactions, allergic reactions, or adverse effects. If you have questions about the drugs you are taking, check with your doctor, nurse or pharmacist.  Copyright 9496-4178 [address]: 15.01. Revision date: 6/25/2018. Care instructions adapted under license by [facility] ([facility]). If you have questions about a medical condition or this instruction, always ask your healthcare professional. [place] [address] any warranty or liability for your use of this information.

## 2021-01-06 RX ORDER — LANSOPRAZOLE 30 MG/1
CAPSULE, DELAYED RELEASE ORAL
Qty: 90 CAPSULE | Refills: 0 | Status: SHIPPED | OUTPATIENT
Start: 2021-01-06 | End: 2022-09-26

## 2021-01-07 NOTE — TELEPHONE ENCOUNTER
PT states that she has been taking the over the counter Prevacid 15 mg 2 times a day. She is confused why there was a request put in for the Prevacid for a 90 day supply. She states that her insurance will not cover this medication. She would like to know if she should just continue with the over the counter Prevacid. Call and advise.   CB #[phone]

## 2021-01-27 ENCOUNTER — TELEPHONE (OUTPATIENT)
Dept: ENT CLINIC | Age: 61
End: 2021-01-27

## 2021-01-28 NOTE — TELEPHONE ENCOUNTER
I spoke to Cornelio. She stated that she was taking the 50 mg pantoprazole with no side effects and with some relief of her LPR D symptoms. However she noticed on her prescription on my chart that I had prescribed 30 mg twice daily so she increased that dose, about 4 days ago, taking 2 of the 50 mg twice daily. She then began to have side effects of fogginess, palpitations, cramps in her hands, and jitteriness. She attributes this to the medication. He stated that her insurance company will not cover pantoprazole at the 30 mg twice daily. She will check with insurance to see if they will cover 15 mg twice daily and then a prescription can be written. In the interim, she will continue the 50 mg twice daily. If this does not produce symptoms and improves her symptoms she will continue at that dosage. He has an appointment to see me again in March and we will discuss this further. She will call if she needs to change to a alternative proton pump inhibitor medication that would be covered by her insurance company.

## 2022-09-26 ENCOUNTER — OFFICE VISIT (OUTPATIENT)
Dept: ENT CLINIC | Age: 62
End: 2022-09-26
Payer: COMMERCIAL

## 2022-09-26 VITALS
SYSTOLIC BLOOD PRESSURE: 114 MMHG | BODY MASS INDEX: 19.16 KG/M2 | OXYGEN SATURATION: 95 % | DIASTOLIC BLOOD PRESSURE: 77 MMHG | TEMPERATURE: 97.3 F | WEIGHT: 126 LBS | HEART RATE: 84 BPM

## 2022-09-26 DIAGNOSIS — K21.9 LPRD (LARYNGOPHARYNGEAL REFLUX DISEASE): Chronic | ICD-10-CM

## 2022-09-26 DIAGNOSIS — H81.09 ACTIVE COCHLEOVESTIBULAR MENIERE'S DISEASE, UNSPECIFIED LATERALITY: Chronic | ICD-10-CM

## 2022-09-26 DIAGNOSIS — H91.93 BILATERAL HEARING LOSS, UNSPECIFIED HEARING LOSS TYPE: Primary | ICD-10-CM

## 2022-09-26 DIAGNOSIS — L29.9 ITCHING OF EAR: Chronic | ICD-10-CM

## 2022-09-26 PROCEDURE — 1036F TOBACCO NON-USER: CPT | Performed by: OTOLARYNGOLOGY

## 2022-09-26 PROCEDURE — G8420 CALC BMI NORM PARAMETERS: HCPCS | Performed by: OTOLARYNGOLOGY

## 2022-09-26 PROCEDURE — 99214 OFFICE O/P EST MOD 30 MIN: CPT | Performed by: OTOLARYNGOLOGY

## 2022-09-26 PROCEDURE — 3017F COLORECTAL CA SCREEN DOC REV: CPT | Performed by: OTOLARYNGOLOGY

## 2022-09-26 PROCEDURE — G8427 DOCREV CUR MEDS BY ELIG CLIN: HCPCS | Performed by: OTOLARYNGOLOGY

## 2022-09-26 RX ORDER — PSEUDOEPHEDRINE HCL 30 MG
100 TABLET ORAL 2 TIMES DAILY
COMMUNITY
Start: 2022-03-01

## 2022-09-26 RX ORDER — TAZAROTENE 0.45 MG/G
LOTION TOPICAL
COMMUNITY
Start: 2022-02-18

## 2022-09-26 RX ORDER — FLUOCINOLONE ACETONIDE 0.11 MG/ML
OIL AURICULAR (OTIC)
Qty: 1 EACH | Refills: 2 | Status: SHIPPED | OUTPATIENT
Start: 2022-09-26

## 2022-09-26 NOTE — PROGRESS NOTES
[facility]       PCP:  Jaime Claude, MD      CHIEF COMPLAINT  Chief Complaint   Patient presents with    Ear Problem     Itching off and on both ears. Laryngitis     LPRD       HISTORY OF PRESENT ILLNESS    Allan German is a 60 y.o. female here for recheck and follow up of LPRD and itching of ears. Saw gastroenterologist who is treating for GERD. Takes tagamet 200 BID. Still have feeling of something in my throat but keeps acid part under control if take it. Old Dr. Hawkins [place] paper chart scanned in 5/24/2017      PAST MEDICAL HISTORY    Past Medical History:   Diagnosis Date    Asthma     Meniere disease     Osteoarthritis        Past Surgical History:   Procedure Laterality Date    CERVICAL FUSION  12/19/2018    cervical neck disection with fusion of C5 and Surekha Dianne Dr. Lola Suggs  10/17/2016    LUMBAR [address] SURGERY  10/2017    NASAL SINUS SURGERY           EXAMINATION    Vitals:    09/26/22 1436   BP: 114/77   Pulse: 84   Temp: 97.3 °F (36.3 °C)   TempSrc: Temporal   SpO2: 95%   Weight: 126 lb (57.2 kg)     General:  WDWN, NAD, alert and oriented  Face: There was no swelling or lesions detected. Voice: Normal with no hoarseness or hot potato voice. Ears: The external ears, mastoids, TMs and EACs appeared to be normal.   Nose: The external nose, nasal septum, turbinates, secretions, and mucosa appeared to be normal.   Sinuses:  Maxillary and frontal sinuses were nontender to palpation and percussion. Oral cavity:  Mucosa, secretions, tongue, and gingiva appeared to be normal.   Oropharynx:  The palatine tonsils, hard and soft palates, uvula, tongue, posterior oropharyngeal wall, mucosa and secretions appeared to be normal.     Salivary Glands:  Normal bilateral parotid and bilateral submandibular salivary glands. Neck:  No masses or tenderness. Trachea midline.   Laryngeal cartilages and hyoid bone normal.  Normal laryngeal crepitus. Thyroid:  Normal, nontender, no goiter or nodules palpable. Lymph nodes:  No cervical lymphadenopathy. Lydia Linton / Buck Pham / Lolis Mancilla was seen today for ear problem and laryngitis. Diagnoses and all orders for this visit:    Bilateral hearing loss, unspecified hearing loss type  -     [address], [place], [facility]    Itching of ear  Comments:  chronic, since at least 12/282020, occurring off and on   Orders:  -     fluocinolone (DERMOTIC) 0.01 % OIL oil; Place 5 drops in the affected ear(s) 2 times daily, for 7 to 14 days, as needed for itching or dermatitis. If symptoms persists longer than 14 days, call office for appointment. Active cochleovestibular Meniere's disease, unspecified laterality  Comments:  takes Xanax as needed for dizziness. LPRD (laryngopharyngeal reflux disease)  Comments:  under control with meds per GI specialist.       RECOMMENDATIONS/PLAN      Xanax 0.25 mg po TID prn dizziness. Return in about 1 year (around 9/26/2023) for recheck/follow-up, and sooner if condition worsens.

## 2022-10-13 ENCOUNTER — PROCEDURE VISIT (OUTPATIENT)
Dept: AUDIOLOGY | Age: 62
End: 2022-10-13
Payer: COMMERCIAL

## 2022-10-13 DIAGNOSIS — H81.01 MENIERE'S DISEASE OF RIGHT EAR: ICD-10-CM

## 2022-10-13 DIAGNOSIS — H90.A11 CONDUCTIVE HEARING LOSS OF RIGHT EAR WITH RESTRICTED HEARING OF LEFT EAR: Primary | ICD-10-CM

## 2022-10-13 PROCEDURE — 92557 COMPREHENSIVE HEARING TEST: CPT | Performed by: AUDIOLOGIST

## 2022-10-13 PROCEDURE — 92567 TYMPANOMETRY: CPT | Performed by: AUDIOLOGIST

## 2022-10-13 NOTE — PROGRESS NOTES
[address] [facility]    10/13/2022     Patient name: Renee Pedraza  Primary Care Physician: Daxa Fall MD   Medical Record Number: [id number]     Renee Pedrzaa   1960, 60 y.o. female   [id number]       Referring Provider: Torey Dacosta MD  Referral Type: In an order in [address] [place] [address]    Reason for Visit: Evaluation of the cause of disorders of hearing, tinnitus, or balance. ADULT AUDIOLOGIC EVALUATION                    Renee Pedraza is a 60 y.o. female seen today, 10/13/2022 , for audiologic evaluation. Patient was seen by referring provider Torey Dacosta MD on 9/26/22 for bilateral hearing loss. AUDIOLOGIC AND OTHER PERTINENT MEDICAL HISTORY:      Renee Pedraza presents with history of Menière's disease. Has difficulty localizing sounds in complex listening environments. Diagnosed with Menière's disease over 20 + years ago. Symptoms are essentially controlled. Has occasional vertigo that is non-debilitating. No other otologic factors noted. IMPRESSIONS:      Results are consistent with asymmetric conductive hearing loss of right ear, Mild to moderate high frequency sensorineural loss of left ear, excellent word recognition for both ears, and normal middle ear function. High frequency sensorineural loss of left ear. Hearing loss is significant enough to result in difficulty understanding speech in some listening environments. Patient to follow medical recommendations per  Torey Dacosta MD.    ASSESSMENT AND FINDINGS:     Otoscopy revealed: Visible tympanic membrane bilaterally    Reliability: Good   Transducer:  Inserts    RIGHT EAR:  Hearing Sensitivity: Moderate asymmetric conductive hearing loss up-slope to normal hearing; 20 dB air-bone gap at 500 Hz  Speech Recognition Threshold: 20 dB HL  Word Recognition: Excellent (%), based on NU-6   Tympanometry: Normal peak pressure and compliance, Type A tympanogram, consistent with normal middle ear function. Acoustic Reflexes: Ipsilateral: Present at elevated sensation levels and Absent at isolated frequencies. LEFT EAR:  Hearing Sensitivity: Normal to moderate sensorineural hearing loss. Speech Recognition Threshold: 10 dB HL  Word Recognition: Excellent (%), based on NU-6   Tympanometry: Normal peak pressure and compliance, Type A tympanogram, consistent with normal middle ear function. Acoustic Reflexes: Ipsilateral: Present at elevated sensation levels and Absent at isolated frequencies. NOTE: An asymmetry of > 10 dB is present from 250-500 Hz in right ear          COUNSELING:      Reviewed purpose of testing completed today, general auditory system function, and results obtained today. The following items are recommended based on patient report and results from today's appointment:   - Continue medical follow-up with Polina Quevedo MD.   - Retest hearing as medically indicated and/or sooner if a change in hearing is noted. Chart CC'd to: Polina Quevedo MD      Degree of   Hearing Sensitivity dB Range   Within Normal Limits (WNL) 0 - 20   Mild 20 - 40   Moderate 40 - 55   Moderately-Severe 55 - 70   Severe 70 - 90   Profound 90 +        RECOMMENDATIONS:        Polina Quevedo MD to medically advise. Return annually to monitor hearing levels.     Erika Schulte  Audiologist    Electronically signed by Erika Schulte on 10/13/22 at 11:21 AM.

## 2022-12-07 DIAGNOSIS — R42 DIZZINESS: Primary | ICD-10-CM

## 2022-12-08 ENCOUNTER — HOSPITAL ENCOUNTER (OUTPATIENT)
Dept: INTERVENTIONAL RADIOLOGY/VASCULAR | Age: 62
Discharge: HOME | End: 2022-12-08
Payer: COMMERCIAL

## 2022-12-08 DIAGNOSIS — M47.812 CERVICAL SPONDYLOSIS WITHOUT MYELOPATHY: ICD-10-CM

## 2022-12-08 PROCEDURE — 64633 DESTROY CERV/THOR FACET JNT: CPT

## 2022-12-08 NOTE — OP NOTE
CERVICAL MEDIAL BRANCH RADIOFREQUENCY ABLATION    Patient: Florentino Garcia  YOB: 1960  MRN: [id number]    Date of Procedure:   12/8/2022    Preoperative Diagnosis: Cervical spondylosis  Postoperative Diagnosis: Same    Operative Procedure: Bilateral C6-7 radiofrequency ablation under fluoroscopy     Indications for Procedure: This patient presents having excellent relief with diagnostic cervical medial branch blocks of the bilateral C4, C5, C6, and C7 medial branches for the C4-5 and C6-7 facet joint. She was 80 to 100% improved for the first 3 to 4 hours. They returned today for the radiofrequency ablation of the medial branches. We will perform 1 level at a time to avoid a complication of paraspinous muscle weakness. Return at a later date for the radiofrequency ablation of the C4-5 level    Procedure note:  Patient was positioned in the prone position on the x-ray table. Sterile prep and drape was performed. Local anesthesia was performed a combination of  topical spray for skin anesthetic and/or local infiltrationat the bilateral C6 and C7 level.      18g insulated Trident needle was advanced to lying position at the \"waist\" of the vertebral body at the C6 and C7 levels on the right followed by left side in the AP view. In the lateral view the needle tip was at the \"centroid\" of the superior articulating process of the C6 and C7 levels on the right followed by left side. An electrode was placed in each needle and the tip of the Trident needle was activated. Sensory stimulation at 50 Hz cause reproducible stimulation at the C6 level at 0.17 V and at the C7 level at 0.24 V on the right. . Sensory stimulation at 50 Hz cause reproducible stimulation at the C6 level at 0.14 V and at the C7 level at 0.18 V on the left.     2 Hz stimulation greater than 2.5 V did not cause motor stimulation to the leg at any level.       0.25 ml of of 0.75% Marcaine and 0.5 mL of 1% lidocaine was injected at each level. After 4 minutes, radiofrequency ablation was then performed at 80 degree celsius using the Trident needle for 1 minute 45 seconds. Decadron 10 mg was injected divided equally between the levels. Needle was removed. The patient tolerated the procedure well and was discharged without apparent complications and provided with postprocedure instructions. Patient may followup as needed for repeat injections and will keep the radiofrequency log to kee [facility]  Mirian Quintana

## 2022-12-08 NOTE — DISCHARGE INSTRUCTIONS
[facility]  PAIN INJECTION WITH STEROID DISCHARGE INSTRUCTIONS      Today, you have had the following procedure: Epidural Steroid Injection, which was performed by Alis Jewell MD.    Keep band-aid to needle site for 24 hours, then remove. Soreness at the needle site is normal and can be expected for at least the first 24 hours. The steroid will begin to work in 24 to 72 hours after the injection. You may shower in 24 hours. No tub baths, hot tubs, whirlpools or swimming for 48 hours. Rest for the duration of the day. You may resume your normal activity the next day. Call your doctor as soon as possible should any of the following occur: any unusual drainage at the site, fever or weakness, new onset of incontinence, or severe pain. If it is after hours and we are not available, please go to the nearest emergency room  Relief from your pain after having this injection is very individual and varies from patient to patient. You will be asked for the following information during your next visit:  New Medications  Maximum percentage of pain relief from the injection and how long it lasted. Current percentage of pain relief  Improvements in pain since injection (i.e intensity, frequency, duration)  Any other problems or concerns as they relate to your pain. IF YOU HAVE AN EMERGENCY, PLEASE GO TO THE NEAREST EMERGENCY  ROOM. For non emergency questions or concerns please contact [phone]  You have received a numbing medication during your procedure that could make your legs or arms feel weak several hours after the procedure. Please limit your activity until the numbness is gone. Expectations from the procedure:    1. Temporary numbness/weakness from the numbing medicine. This will typically last 2-3 hours. 2.  Temporary increase in pain. Your pain may flare up for approximately a day or so.     3.  Temporary pain at injection site--ice and heat helps.    Possible side effects:  Steroids in the spine have not been shown to have the same effects as steroids pills; however, there can be occasional side effects from this procedure:    1. Restlessness, trouble sleeping or jitteriness    2. Increased hunger    3. Flushing or hot flashes - may consider Benadryl    4. Increased blood sugar    5. Headache or hiccups    6. Menstrual spotting- even if you are post-menopausal, this can happen    7. Cramping or \"funmilayo horse\" type muscle discomfort    8. Depression with steroids wearing off.    9.  Similar side effects to oral pills if you have had them before    These side effects do not happen typically; however, they do happen. Do not be concerned if you encounter this, it typically resolves within a couple days. Risks:    1. Spinal headache - Improves with laying down. Increase caffeine and fluid intake. Call us as soon as possible. 2.  Nerve or spinal cord injury (unusual and rare)    3. Spinal injection, infection, or bleeding (unusual and rare)      Please review your blood pressure. If you are not currently treated for high blood pressure and your blood pressure is above the recommended hypertension diagnostic parameters of 120/80, please follow up with your primary care provider in the next week for reevaluation and further management. If you use tobacco products, we strongly encourage you to stop using these products. This will greatly improve many aspects of your health. If you take aspirin (even low dose): There have recently been recommendations to discontinue aspirin 6 days prior to spinal procedures. If your physician does not feel this is appropriate, we will proceed with the understanding that there may be a slight increased risk of intraspinal bleeding. If you take aspirin based on medical provider recommendation, please check with prescribing provider before discontinuing the aspirin.  You may resume your aspirin the day after your procedure. Blood Sugar  Injection of steroids may cause a temporary increase in your blood sugar. Monitor frequently over the next few days. Contact the physician who manages your diabetes if there is significant increase above your normal range. You may eat prior to your next injection.

## 2022-12-09 ENCOUNTER — TELEPHONE (OUTPATIENT)
Dept: ENT CLINIC | Age: 62
End: 2022-12-09

## 2022-12-09 RX ORDER — ALPRAZOLAM 0.25 MG/1
TABLET ORAL
Qty: 60 TABLET | Refills: 0 | Status: SHIPPED | OUTPATIENT
Start: 2022-12-09 | End: 2022-12-29

## 2022-12-09 NOTE — TELEPHONE ENCOUNTER
Patient must have been seen within the past 90 days in order to prescribe this medication. Her last office visit was 9/26/2022. I can prescribe the usual 60 tablets which will last at least until 12/29/2022. Patient will need an appointment for any refills after that date.

## 2022-12-09 NOTE — TELEPHONE ENCOUNTER
Advised pt RX sent to pharmacy and that she will need to be seemn in the office every 90 days for this medication to be filled.  Pt voiced understanding

## 2024-09-04 ENCOUNTER — OFFICE VISIT (OUTPATIENT)
Dept: ENT CLINIC | Age: 64
End: 2024-09-04
Payer: MEDICARE

## 2024-09-04 VITALS
DIASTOLIC BLOOD PRESSURE: 80 MMHG | BODY MASS INDEX: 19.62 KG/M2 | HEART RATE: 80 BPM | TEMPERATURE: 98.2 F | SYSTOLIC BLOOD PRESSURE: 120 MMHG | WEIGHT: 125 LBS | HEIGHT: 67 IN | OXYGEN SATURATION: 97 %

## 2024-09-04 DIAGNOSIS — H57.13 RETRO-ORBITAL PAIN OF BOTH EYES: Primary | ICD-10-CM

## 2024-09-04 DIAGNOSIS — H57.13 PERIORBITAL PAIN, BILATERAL: ICD-10-CM

## 2024-09-04 DIAGNOSIS — K21.9 LPRD (LARYNGOPHARYNGEAL REFLUX DISEASE): Chronic | ICD-10-CM

## 2024-09-04 DIAGNOSIS — H04.123 DRY EYES: ICD-10-CM

## 2024-09-04 DIAGNOSIS — H81.09 ACTIVE COCHLEOVESTIBULAR MENIERE'S DISEASE, UNSPECIFIED LATERALITY: Chronic | ICD-10-CM

## 2024-09-04 DIAGNOSIS — R68.2 DRY MOUTH: ICD-10-CM

## 2024-09-04 PROCEDURE — 3017F COLORECTAL CA SCREEN DOC REV: CPT | Performed by: OTOLARYNGOLOGY

## 2024-09-04 PROCEDURE — G8427 DOCREV CUR MEDS BY ELIG CLIN: HCPCS | Performed by: OTOLARYNGOLOGY

## 2024-09-04 PROCEDURE — 99214 OFFICE O/P EST MOD 30 MIN: CPT | Performed by: OTOLARYNGOLOGY

## 2024-09-04 PROCEDURE — G8420 CALC BMI NORM PARAMETERS: HCPCS | Performed by: OTOLARYNGOLOGY

## 2024-09-04 PROCEDURE — 1036F TOBACCO NON-USER: CPT | Performed by: OTOLARYNGOLOGY

## 2024-09-04 RX ORDER — ESTRADIOL 0.1 MG/G
CREAM VAGINAL
COMMUNITY
Start: 2021-01-01

## 2024-09-04 RX ORDER — SENNOSIDES 8.6 MG
CAPSULE ORAL
COMMUNITY
Start: 2024-01-01

## 2024-09-04 RX ORDER — EPINEPHRINE 0.3 MG/.3ML
INJECTION SUBCUTANEOUS
COMMUNITY
Start: 2024-06-25

## 2024-09-04 RX ORDER — ASPIRIN 325 MG
TABLET ORAL 3 TIMES DAILY
COMMUNITY
Start: 2023-12-24

## 2024-09-04 RX ORDER — BUSPIRONE HYDROCHLORIDE 10 MG/1
10 TABLET ORAL 2 TIMES DAILY PRN
COMMUNITY
Start: 2023-10-02

## 2024-09-04 RX ORDER — ALPRAZOLAM 0.25 MG
0.25 TABLET ORAL NIGHTLY PRN
COMMUNITY

## 2024-09-04 RX ORDER — CYCLOSPORINE 0.5 MG/ML
EMULSION OPHTHALMIC
COMMUNITY
Start: 2023-09-13

## 2024-09-04 RX ORDER — UBIDECARENONE 100 MG
CAPSULE ORAL DAILY
COMMUNITY

## 2024-09-04 RX ORDER — ACETAMINOPHEN 325 MG/1
650 TABLET ORAL EVERY 6 HOURS PRN
COMMUNITY

## 2024-09-18 ENCOUNTER — HOSPITAL ENCOUNTER (OUTPATIENT)
Dept: CT IMAGING | Age: 64
Discharge: HOME | End: 2024-09-18
Attending: OTOLARYNGOLOGY
Payer: MEDICARE

## 2024-09-18 DIAGNOSIS — H57.13 RETRO-ORBITAL PAIN OF BOTH EYES: ICD-10-CM

## 2024-09-18 DIAGNOSIS — H57.13 PERIORBITAL PAIN, BILATERAL: ICD-10-CM

## 2024-09-18 PROCEDURE — 70486 CT MAXILLOFACIAL W/O DYE: CPT

## 2024-09-20 ENCOUNTER — TELEPHONE (OUTPATIENT)
Dept: ENT CLINIC | Age: 64
End: 2024-09-20

## 2024-10-25 ENCOUNTER — OFFICE VISIT (OUTPATIENT)
Dept: ENT CLINIC | Age: 64
End: 2024-10-25

## 2024-10-25 VITALS
HEART RATE: 57 BPM | SYSTOLIC BLOOD PRESSURE: 136 MMHG | TEMPERATURE: 96.8 F | WEIGHT: 123 LBS | OXYGEN SATURATION: 98 % | DIASTOLIC BLOOD PRESSURE: 84 MMHG | HEIGHT: 67 IN | BODY MASS INDEX: 19.3 KG/M2

## 2024-10-25 DIAGNOSIS — R68.2 DRY MOUTH: ICD-10-CM

## 2024-10-25 DIAGNOSIS — H04.123 DRY EYES: Primary | ICD-10-CM

## 2024-10-25 DIAGNOSIS — M35.00 SJOGREN'S SYNDROME, WITH UNSPECIFIED ORGAN INVOLVEMENT (HCC): ICD-10-CM

## 2024-10-25 NOTE — PATIENT INSTRUCTIONS
Avoid biting incision or sutures.  Avoid chewing on lip  You may use acetaminophen (eg. Tylenol) or Ibuprofen (eg. Advil) OTC as needed for pain.   Call for the pathology results in 7 days.    Allow sutures to dissolve and fall off.  .   Call the office if any problems related to this procedure.

## 2024-10-25 NOTE — PROGRESS NOTES
PROCEDURE:  Excision of minor salivary glands of right side of lower lip (CPT 91448).        COUNSELLING:    Carina Srivastava was counseled for the procedure of excision of minor salivary glands of the lower lip for the purpose of diagnosing Sjogren's syndrome.  She was advised of the resulting scar.  She was advised of the risks and potential complications, including, but not limited to, excessive scarring, hypertrophic or keloid scar, deformity of the lip, infection, poor wound healing, need for further surgery, numbness of the lip, weakness or paralysis of the lip, cardiac arrest, heart attack, stroke, death, and risks of adverse reaction to the local anesthetic solution.  All of her questions were answered.  She then stated that she understands and accepts all of this has no further questions and desires to proceed.      INDICATION:  History of dry eyes and dry mouth, with suspicion of Sjogren's syndrome.  Biopsy of minor salivary glands requested by rheumatologist to aid in the diagnosis of probable Sjogren's syndrome.       ANESTHESIA: 1% Lidocaine with 1:100,00 epinephrine, 0.8 ml., by submucosal infiltration.       FINDINGS:    Normal appearing minor salivary glands of left lower lip.  Surrounding tissues all appeared to be normal.        DESCRIPTION OF PROCEDURE:  The operative area of the lower lip was prepped with 70% isopropyl alcohol and then infiltrated in the submucosal plane with the local anesthetic solution.  Adequate anesthesia was obtained.  A vertical linear mucosal incision was made on the vestibule side of the right side of the lower lip approximately 1 cm in length, using a #15 [employer] scalpel.  Pressure was applied to the skin surface of the lower lip to martin the submucosal tissues and identify bulging of minor salivary glands.  Four of these minor salivary glands were dissected in a sharp and blunt fashion and removed with the Metzenbaum sharp dissecting scissors.  Bleeding was